# Patient Record
Sex: FEMALE | Race: BLACK OR AFRICAN AMERICAN
[De-identification: names, ages, dates, MRNs, and addresses within clinical notes are randomized per-mention and may not be internally consistent; named-entity substitution may affect disease eponyms.]

---

## 2017-03-03 ENCOUNTER — HOSPITAL ENCOUNTER (EMERGENCY)
Dept: HOSPITAL 62 - ER | Age: 27
Discharge: HOME | End: 2017-03-03
Payer: SELF-PAY

## 2017-03-03 VITALS — SYSTOLIC BLOOD PRESSURE: 106 MMHG | DIASTOLIC BLOOD PRESSURE: 65 MMHG

## 2017-03-03 DIAGNOSIS — R50.9: ICD-10-CM

## 2017-03-03 DIAGNOSIS — M79.1: ICD-10-CM

## 2017-03-03 DIAGNOSIS — R53.1: ICD-10-CM

## 2017-03-03 DIAGNOSIS — B97.89: ICD-10-CM

## 2017-03-03 DIAGNOSIS — J06.9: Primary | ICD-10-CM

## 2017-03-03 PROCEDURE — 99283 EMERGENCY DEPT VISIT LOW MDM: CPT

## 2017-03-03 PROCEDURE — 87804 INFLUENZA ASSAY W/OPTIC: CPT

## 2017-03-03 NOTE — ER DOCUMENT REPORT
ED General





- General


Chief Complaint: Cough


Stated Complaint: WEAKNESS


Mode of Arrival: Ambulatory


Information source: Patient


Notes: 


26-year-old female presents with complaints of body aches and fevers.  Patient 

notes symptoms have been ongoing now for 2 days.  Patient denies any nausea 

vomiting notes multiple coworkers have flu


TRAVEL OUTSIDE OF THE [place] IN LAST 30 DAYS: No





- HPI


Onset: Other - 2-3 day duration


Onset/Duration: Persistent


Quality of pain: Achy


Severity: Mild


Pain Level: 1


Associated symptoms: Body/muscle aches, Nonproductive cough, Fever


Exacerbated by: Denies


Relieved by: Denies


Similar symptoms previously: No


Recently seen / treated by doctor: No





- Related Data


Allergies/Adverse Reactions: 


 





pineapple [Pineapple] Allergy (Verified 03/03/17 07:36)


 











Past Medical History





- Social History


Smoking Status: Never Smoker


Cigarette use (# per day): No


Chew tobacco use (# tins/day): No


Smoking Education Provided: No


Frequency of alcohol use: None


Drug Abuse: None


Family History: Reviewed & Not Pertinent


Patient has suicidal ideation: No


Patient has homicidal ideation: No


Pulmonary Medical History: Reports: Hx Bronchitis


Neurological Medical History: Reports: Hx Migraine


Renal/ Medical History: Denies: Hx Peritoneal Dialysis


Psychiatric Medical History: Reports: Hx Depression





- Immunizations


Hx Diphtheria, Pertussis, Tetanus Vaccination: Yes





Review of Systems





- Review of Systems


Notes: 


REVIEW OF SYSTEMS:


CONSTITUTIONAL : Admits to fevers


EENT:   Denies eye, ear, throat, or mouth pain or symptoms.  Denies nasal or 

sinus congestion or discharge.  Denies throat, tongue, or mouth swelling or 

difficulty swallowing.


CARDIOVASCULAR:  Denies chest pain.  Denies palpitations or racing or irregular 

heart beat.  Denies ankle edema.


RESPIRATORY:  Denies cough, cold, or chest congestion.  Denies shortness of 

breath, difficulty breathing, or wheezing.


GASTROINTESTINAL:  Denies abdominal pain or distention.  Denies nausea, vomiting

, or diarrhea.  Denies blood in vomitus, stools, or per rectum.  Denies black, 

tarry stools.  Denies constipation. 


GENITOURINARY:  Denies difficulty urinating, painful urination, burning, 

frequency, blood in urine, or discharge.


FEMALE  GENITOURINARY:  Denies vaginal bleeding, heavy or abnormal periods, 

irregular periods.  Denies vaginal discharge or odor. 


MUSCULOSKELETAL: Admits to body aches


SKIN:   Denies rash, lesions or sores.


HEMATOLOGIC :   Denies easy bruising or bleeding.


LYMPHATIC:  Denies swollen, enlarged glands.


NEUROLOGICAL:  Denies confusion or altered mental status.  Denies passing out 

or loss of consciousness.  Denies dizziness or lightheadedness.  Denies 

headache.  Denies weakness or paralysis or loss of use of either side.  Denies 

problems with gait or speech.  Denies sensory loss, numbness, or tingling.  

Denies seizures.


PSYCHIATRIC:  Denies anxiety or stress.  Denies depression, suicidal ideation, 

or homicidal ideation.





ALL OTHER SYSTEMS REVIEWED AND NEGATIVE.








Dictation was performed using Dragon voice recognition software 











PHYSICAL EXAMINATION:





GENERAL: Well-appearing, well-nourished and in no acute distress.





HEAD: Atraumatic, normocephalic.





EYES: Pupils equal round and reactive to light, extraocular movements intact, 

conjunctiva are normal.





ENT: Nares patent, oropharynx clear without exudates.  Moist mucous membranes.





NECK: Normal range of motion, supple without lymphadenopathy





LUNGS: Breath sounds clear to auscultation bilaterally and equal.  No wheezes 

rales or rhonchi.





HEART: Regular rate and rhythm without murmurs





ABDOMEN: Soft, nontender, nondistended abdomen.  No guarding, no rebound.  No 

masses appreciated.





Female : deferred





Musculoskeletal: Normal range of motion, no pitting or edema.  No cyanosis.





NEUROLOGICAL: Cranial nerves grossly intact.  Normal speech, normal gait.  

Normal sensory, motor exams





PSYCH: Normal mood, normal affect.





SKIN: Warm, Dry, normal turgor, no rashes or lesions noted.





Physical Exam





- Vital signs


Vitals: 


 











Temp Pulse Resp BP Pulse Ox


 


 98.0 F   75   18   105/66   98 


 


 03/03/17 07:33  03/03/17 07:33  03/03/17 07:33  03/03/17 07:33  03/03/17 07:33














Course





- Re-evaluation


Re-evalutation: 


03/03/17 09:16


Physical examination notes no significant abnormality, patient's otherwise 

stable at this time.  Lab work pending





03/03/17 09:48


Influenza was negative, patient has viral syndromes otherwise stable for 

discharge











After performing a Medical Screening Examination, I estimate there is LOW risk 

for ACUTE CORONARY SYNDROME, RESPIRATORY FAILURE, SEPSIS OR MENINGITIS, thus I 

consider the discharge disposition reasonable. The patient and I have discussed 

the diagnosis and risks, and we agree with discharging home with close follow-

up. We also discussed returning to the Emergency Department immediately if new 

or worsening symptoms occur. We have discussed the symptoms which are most 

concerning (e.g., changing or worsening pain, trouble swallowing or breathing, 

neck stiffness, fever) that necessitate immediate return.





- Vital Signs


Vital signs: 


 











Temp Pulse Resp BP Pulse Ox


 


 98.0 F   75   18   105/66   98 


 


 03/03/17 07:33  03/03/17 07:33  03/03/17 07:33  03/03/17 07:33  03/03/17 07:33














Discharge





- Discharge


Clinical Impression: 


 Viral upper respiratory infection, Body aches





Condition: Stable


Disposition: HOME, SELF-CARE


Instructions:  Upper Respiratory Illness ([facility])


Additional Instructions: 


Follow up with your physician tomorrow for further care or return to the ED 

IMMEDIATELY if symptoms worsen or new concerns occur


Forms:  Return to Work

## 2017-03-28 ENCOUNTER — HOSPITAL ENCOUNTER (EMERGENCY)
Dept: HOSPITAL 62 - ER | Age: 27
LOS: 1 days | Discharge: HOME | End: 2017-03-29
Payer: SELF-PAY

## 2017-03-28 DIAGNOSIS — S93.402A: Primary | ICD-10-CM

## 2017-03-28 DIAGNOSIS — M25.572: ICD-10-CM
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
## 2017-03-28 PROCEDURE — L1902 AFO ANKLE GAUNTLET PRE OTS: HCPCS

## 2017-03-28 PROCEDURE — 29515 APPLICATION SHORT LEG SPLINT: CPT

## 2017-03-28 PROCEDURE — 2W3FX1Z IMMOBILIZATION OF LEFT HAND USING SPLINT: ICD-10-PCS | Performed by: EMERGENCY MEDICINE

## 2017-03-28 PROCEDURE — 99283 EMERGENCY DEPT VISIT LOW MDM: CPT

## 2017-03-28 PROCEDURE — 73610 X-RAY EXAM OF ANKLE: CPT

## 2017-03-28 NOTE — ER DOCUMENT REPORT
ED Medical Screen (RME)





- General


Chief Complaint: Ankle Injury


Stated Complaint: LEFT ANKLE PAIN


Time seen by provider: 21:27


Mode of Arrival: Ambulatory


Information source: Patient


Notes: 


26-year-old female presents to ED for left ankle pain since yesterday.  She 

states it keeps popping worse when she walks.  States she almost fell off the 

porch on Saturday.  Last menstrual period 03/06/2017














I have greeted and performed a rapid initial assessment of this patient.  A 

comprehensive ED assessment and evaluation of the patient, analysis of test 

results and completion of medical decision making process will be conducted by 

an additional ED providers.


TRAVEL OUTSIDE OF THE [place] IN LAST 30 DAYS: No





- Related Data


Allergies/Adverse Reactions: 


 





pineapple [Pineapple] Allergy (Verified 03/03/17 07:36)


 











Past Medical History





- Social History


Family history: None


Pulmonary Medical History: Reports: Hx Bronchitis


Neurological Medical History: Reports: Hx Migraine


Renal/ Medical History: Denies: Hx Peritoneal Dialysis


Psychiatric Medical History: Reports: Hx Depression





- Immunizations


Hx Diphtheria, Pertussis, Tetanus Vaccination: Yes

## 2017-03-29 VITALS — DIASTOLIC BLOOD PRESSURE: 73 MMHG | SYSTOLIC BLOOD PRESSURE: 119 MMHG

## 2017-03-29 NOTE — ER DOCUMENT REPORT
ED Extremity Problem, Lower





- General


Chief Complaint: Ankle Injury


Stated Complaint: LEFT ANKLE PAIN


Mode of Arrival: Ambulatory


Information source: Patient


Notes: 


25 y/o F presents to ED c/o L ankle pain. Pt reports twisted her ankle 3 days 

ago during mechanical trip and fall down the last 2 steps of stairs. Reports 

did not have obvious injury intitally after fall but over the last 2 days has 

had persistent pain worse with movement and ambulation and states feels popping 

sensation when ambulating. Denies numbness, tingling, or color changes. Denies 

striking head or loc during fall. 


TRAVEL OUTSIDE OF THE [place] IN LAST 30 DAYS: No





- HPI


Patient complains to provider of: Pain


Location: Ankle


Onset/Duration: Gradual


Quality of pain: Achy


Severity: Moderate


Pain Level: 3


Context: Fell, Twisted


Recent injury: Possibly


Associated symptoms: Painful ambulation


Exacerbated by: Movement, Walking


Relieved by: Elevation, Rest





- Related Data


Allergies/Adverse Reactions: 


 





pineapple [Pineapple] Allergy (Verified 03/03/17 07:36)


 











Past Medical History





- General


Information source: Patient





- Social History


Smoking Status: Never Smoker


Frequency of alcohol use: None


Drug Abuse: None


Lives with: Family


Family History: Reviewed & Not Pertinent


Pulmonary Medical History: Reports: Hx Bronchitis


Neurological Medical History: Reports: Hx Migraine


Renal/ Medical History: Denies: Hx Peritoneal Dialysis


Psychiatric Medical History: Reports: Hx Depression


Surgical Hx: Negative





- Immunizations


Hx Diphtheria, Pertussis, Tetanus Vaccination: Yes





Review of Systems





- Review of Systems


Constitutional: No symptoms reported


EENT: No symptoms reported


Cardiovascular: No symptoms reported


Respiratory: No symptoms reported


Gastrointestinal: No symptoms reported


Genitourinary: No symptoms reported


Female Genitourinary: No symptoms reported


Musculoskeletal: See HPI


Skin: No symptoms reported


Hematologic/Lymphatic: No symptoms reported


Neurological/Psychological: No symptoms reported


-: Yes All other systems reviewed and negative





Physical Exam





- Vital signs


Vitals: 


 











Temp Pulse Resp BP Pulse Ox


 


 98.2 F   78   15   119/73   99 


 


 03/29/17 02:28  03/29/17 02:28  03/29/17 02:28  03/29/17 02:28  03/29/17 02:28














- General


General appearance: Appears well, Alert


In distress: None





- Respiratory


Respiratory status: No respiratory distress


Chest status: Nontender


Breath sounds: Normal


Chest palpation: Normal





- Cardiovascular


Rhythm: Regular


Heart sounds: Normal auscultation


Murmur: No


Pulses: Normal: Radial, Posterior tibial, Dorsalis pedis


Normal capillary refill: Yes





- Back


Back: Normal, Nontender





- Extremities


General upper extremity: Normal inspection, Nontender, Normal color, Normal ROM

, Normal strength, Normal temperature.  No: Tender, Edema


General lower extremity: Normal inspection, Nontender, Normal color, Normal ROM

, Normal strength, Normal temperature, Normal weight bearing.  No: Tender, Edema

, Catracho's sign


Hip: Normal, Nontender


Thigh: Normal, Nontender


Knee: Normal, Nontender


Calf: Normal, Nontender


Ankle: Tender - Mild tenderness with palpation to anterior and medial aspect of 

left ankle.  Full but painful active, passive, and against resistance range of 

motion.  Motor and neurovascular function intact with immediate capillary refill

, palpable pedal pulses, and intact sensation..  No: Normal, Nontender, Abrasion

, Deformity, Ecchymosis, Edema, Instability, Laceration, Limited ROM, Positive 

Lowery's test, Unable to bear weight, Other


Foot: Normal, Nontender





- Neurological


Neuro grossly intact: Yes


Cognition: Normal


Orientation: AAOx4


Jovon Coma Scale Eye Opening: Spontaneous


Jovon Coma Scale Verbal: Oriented


[place] Coma Scale Motor: Obeys Commands


[place] Coma Scale Total: 15


Speech: Normal


Motor strength normal: LUE, RUE, LLE, RLE


Sensory: Normal





- Skin


Skin Temperature: Warm


Skin Moisture: Dry


Skin Color: Normal





Course





- Re-evaluation


Re-evalutation: 





03/29/17 02:21


Patient hemodynamically stable, in no distress.  X-rays negative for osseous 

injury.  Ankle stirrup splint placed.  Patient appears stable for discharge and 

agrees with home care, follow-up with PCP, and ED return precautions.





- Vital Signs


Vital signs: 


 











Temp Pulse Resp BP Pulse Ox


 


 98.2 F   78   15   119/73   99 


 


 03/29/17 02:28  03/29/17 02:28  03/29/17 02:28  03/29/17 02:28  03/29/17 02:28














- Diagnostic Test


Radiology reviewed: Image reviewed, Reports reviewed





Procedures





- Immobilization


  ** Left Ankle


Time completed: 02:22


Pre-Proc Neuro Vasc Exam: Normal


Immobilizer type: Ankle stirrup


Performed by: RN, PCT


Post-Proc Neuro Vasc Exam: Normal


Alignment checked and good: Yes





Discharge





- Discharge


Clinical Impression: 


Left ankle sprain


Qualifiers:


 Encounter type: initial encounter Involved ligament of ankle: unspecified 

ligament Qualified Code(s): S93.402A - Sprain of unspecified ligament of left 

ankle, initial encounter





Condition: Stable


Disposition: HOME, SELF-CARE


Additional Instructions: 


SPRAINED ANKLE:





     Your sprained ankle results from stretching or tearing of the ligaments 

which support the ankle.  This usually results from twisting the foot inward 

and under.  The ligaments will require time and protection in order to heal 

properly.  Many ankle sprains are quite disabling, and should be taken 

seriously.


     The usual treatment for an ankle sprain is cold packs; protection with tape

, splints, or wraps; elevation; and staying off the ankle for at least a day.  

As the ankle improves, you can walk IF it's not painful to bear weight.  Sports 

are best postponed until healing is complete.  More serious sprains usually 

require strengthening exercises after early healing.


     Your physician has assessed the seriousness of the ligament injury to your 

ankle.  However, the treatment may change, depending on how your ankle 

progresses.  If further exams were recommended, it is important that you follow 

through.  Call the doctor if your foot becomes numb, painful, or severely 

swollen.








ANKLE STIRRUP SPLINT:


     You are to use an ankle brace called a stirrup splint.  This type of brace 

allows you to place greater stresses on the ankle without risk of re-injury, 

and is often used for more severe ankle injuries such as avulsion fractures and 

ligament ruptures.


     The splint can be worn over a sock or tape.  For proper support, wear the 

splint with a shoe over it.


     It's important that the splint fit properly.  Adjust the heel tension, if 

needed.  If your splint has air bladders, peel back the bottom of each air 

bladder, then move the Velcro attachment of the heel strap up or down.  Air 

bladder pressure can be adjusted by pulling up the valve at the top, threading 

the air tube down into the main bladder, then blowing air into the bladder or 

squeezing it out.  The two sides of the stirrup can be moved forward or back on 

your ankle by changing the attachment of the main straps.


     If you are unable to use the ankle comfortably in the splint, return for re

-evaluation.








ICE & ELEVATION:


     Apply ice packs frequently against the painful area.  Many different 

schedules are recommended, such as "20 minutes on, 20 minutes off" or "one hour 

ice, two hours rest."  If you need to work, you may need to go longer between 

ice treatments.  You should plan to have the area ice packed AT LEAST one-

fourth of the time.


     The ice should be applied over the wrap, tape, or splint, or over a layer 

of cloth -- not directly against the skin.  Some ice bags have a built-in cloth 

and can be put directly on the skin.


     Your injured part should be elevated as much as possible over the next 48 

hours.  Try to keep the injury above the level of the heart. Avoid use of the 

injured area.  Elevation and rest will decrease the swelling.





Anti-Inflammatory Medication





     You have received a prescription for an antiinflammatory agent. This is an 

excellent, safe drug for pain control.  In addition, it has potent 

antiinflammatory effects which are beneficial, especially in the treatment of 

injuries, arthritis, or tendonitis.


     It's best to take this medicine with food.  Persons with ulcer disease or 

allergy to aspirin should notify their physician of this before taking this 

drug.


     Take the medication exactly as prescribed.  Don't take additional doses 

unless instructed to do so by your doctor.  If you develop wheezing, shortness 

of breath, hives, faintness, stomach pain, vomiting, or dark black stools, 

return for re-evaluation at once.





FOLLOW-UP CARE:


Follow-up with your primary care provider this week.


Return to the emergency department for any worsening symptoms or concerns.


Prescriptions: 


Naproxen 500 mg PO BIDP PRN #10 tablet


 PRN Reason: 


Forms:  Return to Work

## 2017-04-02 ENCOUNTER — HOSPITAL ENCOUNTER (EMERGENCY)
Dept: HOSPITAL 62 - ER | Age: 27
Discharge: HOME | End: 2017-04-02
Payer: SELF-PAY

## 2017-04-02 VITALS — SYSTOLIC BLOOD PRESSURE: 122 MMHG | DIASTOLIC BLOOD PRESSURE: 67 MMHG

## 2017-04-02 DIAGNOSIS — S93.402A: Primary | ICD-10-CM

## 2017-04-02 DIAGNOSIS — M25.572: ICD-10-CM
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
## 2017-04-02 DIAGNOSIS — R11.0: ICD-10-CM

## 2017-04-02 PROCEDURE — S0119 ONDANSETRON 4 MG: HCPCS

## 2017-04-02 PROCEDURE — 73610 X-RAY EXAM OF ANKLE: CPT

## 2017-04-02 PROCEDURE — 99283 EMERGENCY DEPT VISIT LOW MDM: CPT

## 2017-04-02 PROCEDURE — 81025 URINE PREGNANCY TEST: CPT

## 2017-04-02 NOTE — ER DOCUMENT REPORT
HPI





- HPI


Patient complains to provider of: ankle pain


Pain Level: 3


Context: 


Patient is a 26-year-old female presents emergency Department complaining of 

ankle pain.  She was evaluated here on March 28 and diagnosed with a left ankle 

sprain sent home with ankle brace as well as encourage for Rice management.  

Patient states that she's been on her feet constantly for work and Motrin is 

not managing her pain well enough when she is at work.  Otherwise she denies 

any worsening swelling or bruising.  She is able to bear weight and gait with a 

limp.





ROS also positive for nausea.  Patient states that her last menstrual period 

was at the end of February. 





- REPRODUCTIVE


LMP: 3/6/17


Reproductive: DENIES: Pregnant:





- DERM


Skin Color: Normal, Pink





Past Medical History





- Social History


Smoking Status: Unknown if Ever Smoked


Family History: Reviewed & Not Pertinent


Patient has suicidal ideation: No


Patient has homicidal ideation: No


Pulmonary Medical History: Reports: Hx Bronchitis


Neurological Medical History: Reports: Hx Migraine


Renal/ Medical History: Denies: Hx Peritoneal Dialysis


Psychiatric Medical History: Reports: Hx Depression





- Immunizations


Hx Diphtheria, Pertussis, Tetanus Vaccination: Yes





Vertical Provider Document





- CONSTITUTIONAL


Agree With Documented VS: Yes


Exam Limitations: No Limitations


General Appearance: WD/WN, No Apparent Distress





- INFECTION CONTROL


TRAVEL OUTSIDE OF THE [place] IN LAST 30 DAYS: No





- RESPIRATORY


O2 Sat by Pulse Oximetry: 100





- GI/ABDOMEN


Gastrointestinal: Abdomen Soft, Abdomen Non-Tender, No Organomegaly, Normal 

Bowel Sounds





- MUSCULOSKELETAL/EXTREMETIES


Musculoskeletal/Extremeties: MAEW, FROM, Tender - Concern for high ankle sprain

, No Edema.  negative: Eccymosis





- NEURO


Level of Consciousness: Awake, Alert, Appropriate


Motor/Sensory: No Motor Deficit, No Sensory Deficit





- DERM


Integumentary: Warm, Dry, No Rash





Course





- Re-evaluation


Re-evalutation: 


04/02/17 22:10 patient is a 26-year-old female who presents emergency 

department for reevaluation of her right ankle.  We have imaging of the ankle 

does not reveal any fracture or dislocation.  Educated patient that due to the 

nature of her work that her ankle will take longer to heal.  Patient expressed 

understanding.








- Vital Signs


Vital signs: 


 











Temp Pulse Resp BP Pulse Ox


 


 98.1 F   82   18   122/67   100 


 


 04/02/17 20:00  04/02/17 20:00  04/02/17 20:00  04/02/17 20:00  04/02/17 20:00














- Diagnostic Test


Radiology reviewed: Image reviewed, Reports reviewed





Discharge





- Discharge


Clinical Impression: 


 Nausea





Left ankle sprain


Qualifiers:


 Encounter type: initial encounter Involved ligament of ankle: unspecified 

ligament Qualified Code(s): S93.402A - Sprain of unspecified ligament of left 

ankle, initial encounter





Condition: Good


Disposition: HOME, SELF-CARE


Additional Instructions: 


Continued to use ice, compression and elevation.  Please continue taking Motrin 

as he Then.  Only take the tramadol as needed for breakthrough pain.


Prescriptions: 


Ondansetron HCl [Zofran] 4 mg PO Q8HP PRN #10 tablet


 PRN Reason: 


Tramadol HCl 50 mg PO Q8HP PRN #10 tablet


 PRN Reason: 


Referrals: 


YASMEEN RUIZ MD [[facility]] - Follow up as needed


MAHIN GONZALEZ MD [NO LOCAL MD] - Follow up as needed

## 2017-05-14 ENCOUNTER — HOSPITAL ENCOUNTER (EMERGENCY)
Dept: HOSPITAL 62 - ER | Age: 27
Discharge: HOME | End: 2017-05-14
Payer: SELF-PAY

## 2017-05-14 VITALS — SYSTOLIC BLOOD PRESSURE: 112 MMHG | DIASTOLIC BLOOD PRESSURE: 68 MMHG

## 2017-05-14 DIAGNOSIS — Z91.018: ICD-10-CM

## 2017-05-14 DIAGNOSIS — H10.9: Primary | ICD-10-CM

## 2017-05-14 DIAGNOSIS — H11.003: ICD-10-CM

## 2017-05-14 PROCEDURE — 99282 EMERGENCY DEPT VISIT SF MDM: CPT

## 2017-05-14 NOTE — ER DOCUMENT REPORT
ED Eye Complaint





- General


Chief Complaint: Redness of Eye


Stated Complaint: EYE IRRITATION


Time Seen by Provider: 05/14/17 22:04


Notes: 


Patient is a 26-year-old female that comes emergency department for chief 

complaint of right eye itching and irritation, she states this began yesterday, 

she states her eye is more red now and she has some discomfort in the eye as 

well.  She reports only clear drainage.  She denies injury to the eye but 

admits she has been rubbing her eye.  He does not wear any visual correction.  

Her significant other has developed the same symptoms.  She denies fever, she 

denies any other symptoms.





TRAVEL OUTSIDE OF THE [place] IN LAST 30 DAYS: No





- Related Data


Allergies/Adverse Reactions: 


 





pineapple [Pineapple] Allergy (Verified 04/02/17 20:00)


 











Past Medical History





- General


Information source: Patient





- Social History


Smoking Status: Never Smoker


Cigarette use (# per day): No


Chew tobacco use (# tins/day): No


Frequency of alcohol use: None


Drug Abuse: None


Lives with: Family


Family History: Reviewed & Not Pertinent


Patient has suicidal ideation: No


Patient has homicidal ideation: No


Pulmonary Medical History: Reports: Hx Bronchitis


Neurological Medical History: Reports: Hx Migraine


Renal/ Medical History: Denies: Hx Peritoneal Dialysis


Psychiatric Medical History: Reports: Hx Depression


Surgical Hx: Negative





- Immunizations


Hx Diphtheria, Pertussis, Tetanus Vaccination: Yes





Review of Systems





- Review of Systems


Constitutional: No symptoms reported


EENT: See HPI


Cardiovascular: No symptoms reported


Respiratory: No symptoms reported


Gastrointestinal: No symptoms reported


Genitourinary: No symptoms reported


Female Genitourinary: No symptoms reported


Musculoskeletal: No symptoms reported


Skin: No symptoms reported


Hematologic/Lymphatic: No symptoms reported


Neurological/Psychological: No symptoms reported





Physical Exam





- Vital signs


Vitals: 


 











Temp Pulse Resp BP Pulse Ox


 


 97.7 F   85   16   117/65   100 


 


 05/14/17 20:50  05/14/17 20:50  05/14/17 20:50  05/14/17 20:50  05/14/17 20:50











Interpretation: Normal





- General


General appearance: Appears well


In distress: None





- HEENT


Head: Normocephalic, Atraumatic


Eyes: Normal


Conjunctiva: Other - Mild injection of the right conjunctiva, no matting of the 

eyelashes, no swelling of the eyelids, no purulent injection noted, normal 

pupils, normal EOMs, small pterygium noted bilaterally which does not cover the 

cornea, exam otherwise unremarkable


Cornea: Normal, Other - No uptake.  No: Corneal abrasion, Corneal ulcer, 

Dendrite, Flourescein stain uptake


Eyelashes: Normal


Pupils: PERRL


Corrective lenses worn: No


Anterior chamber: Normal.  No: Hyphema


Ears: Normal


Nasal: Normal


Mouth/Lips: Normal


Mucous membranes: Normal


Pharynx: Normal


Neck: Normal





- Respiratory


Respiratory status: No respiratory distress


Chest status: Nontender


Breath sounds: Normal.  No: Decreased air movement, Nonproductive cough, 

Wheezing


Chest palpation: Normal





- Cardiovascular


Rhythm: Regular.  No: Tachycardia


Heart sounds: Normal auscultation, S1 appreciated, S2 appreciated


Murmur: No





- Abdominal


Inspection: Normal


Distension: No distension


Bowel sounds: Normal


Tenderness: Nontender.  No: Tender


Organomegaly: No organomegaly





- Back


Back: Normal, Nontender.  No: Tender





- Extremities


General upper extremity: Normal inspection, Nontender, Normal ROM, Normal 

strength


General lower extremity: Normal inspection, Nontender, Normal ROM, Normal 

strength





- Neurological


Neuro grossly intact: Yes


Cognition: Normal


Orientation: AAOx4


[place] Coma Scale Eye Opening: Spontaneous


[place] Coma Scale Verbal: Oriented


[place] Coma Scale Motor: Obeys Commands


[place] Coma Scale Total: 15


Speech: Normal


Motor strength normal: LUE, RUE, LLE, RLE


Sensory: Normal





- Psychological


Associated symptoms: Normal affect, Normal mood





- Skin


Skin Temperature: Warm


Skin Moisture: Dry


Skin Color: Normal





Course





- Re-evaluation


Re-evalutation: 


Examination is consistent with mild conjunctivitis, patient does not have any 

discolored discharge, the Woods light examination shows no dye uptake or any 

concerning findings.  Incidental finding of pterygium which is also mild.  

Discussed likely viral infection, patient was given topical antibiotics to use 

if symptoms do not clear up in the next couple of days, discussed follow-up, 

discussed return precautions, patient states understanding and agreement.





- Vital Signs


Vital signs: 


 











Temp Pulse Resp BP Pulse Ox


 


 97.6 F   80   18   112/68   99 


 


 05/14/17 23:08  05/14/17 23:08  05/14/17 23:08  05/14/17 23:08  05/14/17 23:08














Discharge





- Discharge


Clinical Impression: 


Conjunctivitis


Qualifiers:


 Conjunctivitis type: unspecified Laterality: right Qualified Code(s): H10.9 - 

Unspecified conjunctivitis





Condition: Stable


Disposition: HOME, SELF-CARE


Additional Instructions: 


Examination is consistent with viral conjunctivitis.  This goes away with time 

but is contagious.  


Wash hands frequently, try to avoid rubbing the eye with your hands. 


Use the drop as prescribed in addition to this.


Follow-up with primary care.


Return to emergency department for any concerning worsening symptoms including 

swelling of the eye, loss of vision, severe pain, etc.


Forms:  Return to Work

## 2017-05-16 ENCOUNTER — HOSPITAL ENCOUNTER (EMERGENCY)
Dept: HOSPITAL 62 - ER | Age: 27
Discharge: HOME | End: 2017-05-16
Payer: SELF-PAY

## 2017-05-16 VITALS — DIASTOLIC BLOOD PRESSURE: 68 MMHG | SYSTOLIC BLOOD PRESSURE: 115 MMHG

## 2017-05-16 DIAGNOSIS — H93.19: ICD-10-CM

## 2017-05-16 DIAGNOSIS — H10.9: Primary | ICD-10-CM

## 2017-05-16 PROCEDURE — 99282 EMERGENCY DEPT VISIT SF MDM: CPT

## 2017-05-16 NOTE — ER DOCUMENT REPORT
HPI





- HPI


Patient complains to provider of: conjunctivitis


Pain Level: 3


Context: 


patient is a 26 year old female who presents complaining of intermittent 

tinnitus was diagnosed on Sunday.  Patient has been taking the drops less than 

the prescribed amount.  She states she came in today for a new work note since 

she cannot go back to work in her current condition.  Otherwise denies any 

changes in vision, light sensitivity, blurriness, he was.





- REPRODUCTIVE


LMP: 5/6/17


Reproductive: DENIES: Pregnant:





- DERM


Skin Color: Normal





Past Medical History





- Social History


Smoking Status: Unknown if Ever Smoked


Family History: Reviewed & Not Pertinent


Patient has suicidal ideation: No


Patient has homicidal ideation: No


Pulmonary Medical History: Reports: Hx Bronchitis


Neurological Medical History: Reports: Hx Migraine


Renal/ Medical History: Denies: Hx Peritoneal Dialysis


Psychiatric Medical History: Reports: Hx Depression





- Immunizations


Hx Diphtheria, Pertussis, Tetanus Vaccination: Yes





Vertical Provider Document





- CONSTITUTIONAL


Agree With Documented VS: Yes


Exam Limitations: No Limitations


General Appearance: WD/WN, No Apparent Distress





- INFECTION CONTROL


TRAVEL OUTSIDE OF THE [place] IN LAST 30 DAYS: No





- HEENT


HEENT: Atraumatic, Conjuctival Injection, Normal ENT Exam, Normocephalic, PERRLA





- NECK


Neck: Normal Inspection.  negative: Lymphadenopathy-Left, Lymphadenopathy-Right





- RESPIRATORY


Respiratory: Breath Sounds Normal, No Respiratory Distress, Chest Non-Tender


O2 Sat by Pulse Oximetry: 98





- CARDIOVASCULAR


Cardiovascular: Regular Rate, Regular Rhythm, No Murmur





Course





- Re-evaluation


Re-evalutation: 





05/16/17 17:16


Told patient to continue her polymyxin drops 4 times a day.  Follow-up with 

ophthalmology tomorrow.





- Vital Signs


Vital signs: 


 











Temp Pulse Resp BP Pulse Ox


 


 97.9 F   79   16   115/68   98 


 


 05/16/17 14:35  05/16/17 14:35  05/16/17 14:35  05/16/17 14:35  05/16/17 14:35














Discharge





- Discharge


Clinical Impression: 


 Conjunctivitis





Condition: Good


Disposition: HOME, SELF-CARE


Instructions:  Conjunctivitis ([facility]), Eyedrop Use ([facility])


Additional Instructions: 


Examination is consistent with viral conjunctivitis.  This goes away with time 

but is contagious.  


Wash hands frequently, try to avoid rubbing the eye with your hands. 


Use the drop as prescribed in addition to this.


Follow-up with primary care.


Return to emergency department for any concerning worsening symptoms including 

swelling of the eye, loss of vision, severe pain, etc.


Forms:  Return to Work


Referrals: 


ABBI ROBERSON MD [ACTIVE STAFF] - Follow up tomorrow

## 2017-05-22 ENCOUNTER — HOSPITAL ENCOUNTER (EMERGENCY)
Dept: HOSPITAL 62 - ER | Age: 27
Discharge: HOME | End: 2017-05-22
Payer: SELF-PAY

## 2017-05-22 VITALS — SYSTOLIC BLOOD PRESSURE: 115 MMHG | DIASTOLIC BLOOD PRESSURE: 61 MMHG

## 2017-05-22 DIAGNOSIS — H11.001: ICD-10-CM

## 2017-05-22 DIAGNOSIS — H10.89: Primary | ICD-10-CM

## 2017-05-22 DIAGNOSIS — H57.8: ICD-10-CM

## 2017-05-22 PROCEDURE — 99283 EMERGENCY DEPT VISIT LOW MDM: CPT

## 2017-05-22 NOTE — ER DOCUMENT REPORT
ED General





- General


Chief Complaint: Drainage from Eye


Stated Complaint: EYE PAIN


Time Seen by Provider: 05/22/17 16:05


Mode of Arrival: Ambulatory


Information source: Patient


Notes: 


Patient is a 26 year old female who presents with 1 week history of eye redness

, green drainage and pain. She states she has been seen here twice for the same 

but no relief in symptoms. She endorses med compliance with the polytrim drops. 

She does not wear contacts or eye glasses. She endorses pain with movement of 

her eye but denies fever, chills, eye lid swelling, changes in vision, blurred 

vision, headache, dizziness, or FB sensation. She has not seen an eye doctor. 


TRAVEL OUTSIDE OF THE [place] IN LAST 30 DAYS: No





- Related Data


Allergies/Adverse Reactions: 


 





No Known Drug Allergies Allergy (Verified 05/22/17 15:17)


 


pineapple [Pineapple] Allergy (Verified 05/22/17 15:17)


 











Past Medical History





- General


Information source: Patient





- Social History


Smoking Status: Never Smoker


Family History: Reviewed & Not Pertinent


Patient has suicidal ideation: No


Patient has homicidal ideation: No


Pulmonary Medical History: Reports: Hx Bronchitis


Neurological Medical History: Reports: Hx Migraine


Renal/ Medical History: Denies: Hx Peritoneal Dialysis


Psychiatric Medical History: Reports: Hx Depression





- Immunizations


Hx Diphtheria, Pertussis, Tetanus Vaccination: Yes





Review of Systems





- Review of Systems


Constitutional: See HPI


EENT: See HPI


Cardiovascular: No symptoms reported


Respiratory: No symptoms reported


Gastrointestinal: No symptoms reported


Genitourinary: No symptoms reported


Female Genitourinary: No symptoms reported


Musculoskeletal: No symptoms reported


Skin: No symptoms reported


Hematologic/Lymphatic: No symptoms reported


Neurological/Psychological: No symptoms reported





Physical Exam





- Vital signs


Vitals: 


 











Temp Pulse Resp BP Pulse Ox


 


 98.0 F   77   16   104/66   99 


 


 05/22/17 15:16  05/22/17 15:16  05/22/17 15:16  05/22/17 15:16  05/22/17 15:16














- Notes


Notes: 


PHYSICAL EXAM:


CONSTITUTIONAL: Alert and oriented, well-appearing and in no acute distress. 


HENT: Normocephalic, atraumatic. Trachea midline. Uvula midline. Moist mucous 

membranes.


EYES: Pupils equal round and reactive to light, EOM intact. Sclera anicteric, 

right conjunctiva erythematous. No chemosis. no entrapment. Pterygium of right 

eye noted. No FB noted. Green drainage noted to medial canthus. 


NECK: supple without lymphadenopathy. No midline tenderness or paraspinous 

muscle spasms. No step-offs or deformities. ROM intact. 


HEART: Regular rate and rhythm without murmurs. 


LUNGS: CTAB and equal. No wheezes, rales or rhonchi. 


EXTREMITIES: Normal range of motion, no pitting edema. No cyanosis. Cap Refill <

3 seconds.


NEURO: Cranial nerves grossly intact. Normal sensory/motor exams.


PSYCH: Normal mood, normal affect. 


SKIN: Warm and dry. Normal turgor. No rashes or lesions noted.





Course





- Re-evaluation


Re-evalutation: 





05/22/17 16:49


Patient seen and examined. Exam consistent with bacterial conjunctivitis, 

incidental finding of pterygium. EOM intact, no orbital edema or erythema to 

suggest preseptal or orbital cellulitis. Discussed good hand hygiene to prevent 

further infection. Will given opth abx ointment and advised follow-up with 

opthamology. 





At this time, will discharge with return precautions and follow-up 

recommendations. Verbal discharge instructions given at the bedside and 

opportunity for questions given. Medication warnings reviewed. Patient is in 

agreement with this plan and has verbalized understanding of return precautions 

and the need for primary care follow-up in the next 24-72 hours. 








- Vital Signs


Vital signs: 


 











Temp Pulse Resp BP Pulse Ox


 


 98.0 F   77   16   104/66   99 


 


 05/22/17 15:16  05/22/17 15:16  05/22/17 15:16  05/22/17 15:16  05/22/17 15:16














Discharge





- Discharge


Clinical Impression: 


 Bacterial conjunctivitis of right eye





Pterygium


Qualifiers:


 Laterality: right Qualified Code(s): H11.001 - Unspecified pterygium of right 

eye





Condition: Stable


Disposition: HOME, SELF-CARE


Additional Instructions: 


Conjunctivitis





     You have an infection in your eye, commonly known as "pink eye." 

Conjunctivitis causes redness, mild discomfort, itching, and mattering on the 

eyelids.  It is very contagious, so you must be careful to wash your hands 

after touching your face so you don't pass the infection on to others.


     Conjunctivitis is caused by both viruses and bacteria.  It usually 

responds quickly to treatment with antibiotic drops.  These should be placed in 

the eye as prescribed (usually every three to four hours while you're awake).  

If you wear contact lenses, don't put them in your eyes until the infection is 

cleared and you are no longer using the drops (unless your doctor advises you 

otherwise).


     Should you develop increasing eye pain, severe swelling, decreased vision, 

or fail to improve as expected, please return for re-examination.


Prescriptions: 


Gentamicin Sulfate 3.5 gm OD Q8H #1 oint...g.


Forms:  Return to Work


Referrals: 


VICKY RAMIREZ DO [ACTIVE STAFF] - Follow up tomorrow

## 2017-11-12 ENCOUNTER — HOSPITAL ENCOUNTER (EMERGENCY)
Dept: HOSPITAL 62 - ER | Age: 27
Discharge: HOME | End: 2017-11-12
Payer: SELF-PAY

## 2017-11-12 VITALS — DIASTOLIC BLOOD PRESSURE: 60 MMHG | SYSTOLIC BLOOD PRESSURE: 106 MMHG

## 2017-11-12 DIAGNOSIS — M79.645: ICD-10-CM

## 2017-11-12 DIAGNOSIS — M77.9: Primary | ICD-10-CM

## 2017-11-12 PROCEDURE — 99283 EMERGENCY DEPT VISIT LOW MDM: CPT

## 2017-11-12 NOTE — RADIOLOGY REPORT (SQ)
EXAM DESCRIPTION:  HAND LEFT 3 VIEWS



COMPLETED DATE/TIME:  11/12/2017 9:56 am



REASON FOR STUDY:  Thumb pain



COMPARISON:  None.



EXAM PARAMETERS:  NUMBER OF VIEWS: Three views.

TECHNIQUE: AP, lateral and oblique  radiographic images acquired of the left hand.

LIMITATIONS: None.



FINDINGS:  MINERALIZATION: Normal.

BONES: No acute fracture or dislocation.  No worrisome bone lesions.

JOINTS: No effusions.

SOFT TISSUES: No soft tissue swelling.  No foreign body.

OTHER: No other significant finding.



IMPRESSION:  NEGATIVE STUDY OF THE LEFT HAND. NO RADIOGRAPHIC EVIDENCE OF ACUTE INJURY.



TECHNICAL DOCUMENTATION:  JOB ID:  [id number]

 2011 [employer]- All Rights Reserved

## 2017-11-12 NOTE — ER DOCUMENT REPORT
HPI





- HPI


Patient complains to provider of: left thumb pain


Onset: Other - several weeks


Onset/Duration: Gradual, Persistent, Worse


Pain Level: 4


Context: 





28 yo right handed female c/o left base of thumb pain that radiates into radial 

wrist with movement. Worse last night with hand wrapped around the steering 

wheel. No specificia injury.


Associated Symptoms: None


Exacerbated by: Movement


Relieved by: Denies


Similar symptoms previously: No


Recently seen / treated by doctor: No





- ROS


ROS below otherwise negative: Yes


Systems Reviewed and Negative: Yes All other systems reviewed and negative





- REPRODUCTIVE


Reproductive: DENIES: Pregnant:





- DERM


Skin Color: Normal





Past Medical History





- General


Information source: Patient





- Social History


Smoking Status: Never Smoker


Frequency of alcohol use: None


Drug Abuse: None


Lives with: Family


Family History: Reviewed & Not Pertinent


Patient has suicidal ideation: No


Patient has homicidal ideation: No


Pulmonary Medical History: Reports: Hx Bronchitis


Neurological Medical History: Reports: Hx Migraine


Renal/ Medical History: Denies: Hx Peritoneal Dialysis


Psychiatric Medical History: Reports: Hx Depression


Surgical Hx: Negative





- Immunizations


Hx Diphtheria, Pertussis, Tetanus Vaccination: Yes





Vertical Provider Document





- CONSTITUTIONAL


Agree With Documented VS: Yes


Exam Limitations: No Limitations


General Appearance: No Apparent Distress





- INFECTION CONTROL


TRAVEL OUTSIDE OF THE [place] IN LAST 30 DAYS: No





- NECK


Neck: Supple





- RESPIRATORY


O2 Sat by Pulse Oximetry: 100





- MUSCULOSKELETAL/EXTREMETIES


Musculoskeletal/Extremeties: MAEW, FROM, Tender - base of left thumb to radial 

tendon





- NEURO


Level of Consciousness: Awake, Alert


Motor/Sensory: No Motor Deficit, No Sensory Deficit





- DERM


Integumentary: Warm, Dry





Course





- Re-evaluation


Re-evalutation: 





11/12/17 10:28


X-rays negative per rad


11/12/17 10:28








- Vital Signs


Vital signs: 


 











Temp Pulse Resp BP Pulse Ox


 


 98.3 F   62   12   108/59 L  100 


 


 11/12/17 09:10  11/12/17 09:10  11/12/17 09:10  11/12/17 09:10  11/12/17 09:10














Procedures





- Immobilization


  ** Left Thumb


Time completed: 10:25


Pre-Proc Neuro Vasc Exam: Normal


Immobilizer type: Thumb spica


Performed by: PCT


Post-Proc Neuro Vasc Exam: Normal


Alignment checked and good: Yes





Discharge





- Discharge


Clinical Impression: 


 left thumb tendonitis





Condition: Good


Disposition: HOME, SELF-CARE


Instructions:  Anti-Inflammatory Medication ([facility]), Splint Precautions ([facility]), 

Temporary Splint ([facility]), Tendonitis ([facility])


Additional Instructions: 


splint few days


to er if worse


warm compress


tylenol


motrin


see orthopedic doctor if persists





Please complete the patient satisfaction survey if you get one, and return it.. 

If you do not receive a survey,  then you can go to the [facility] website, [email or web address] 

and place your comments about your very good care. Thank you very much. It was 

a pleasure being your medical provider today.


Prescriptions: 


Ibuprofen [Motrin 600 mg Tablet] 600 mg PO Q8HP PRN #30 tablet


 PRN Reason: 


Forms:  Return to Work


Referrals: 


ANA GASTON,  [ACTIVE STAFF] - Follow up as needed

## 2017-12-28 ENCOUNTER — HOSPITAL ENCOUNTER (EMERGENCY)
Dept: HOSPITAL 62 - ER | Age: 27
Discharge: HOME | End: 2017-12-28
Payer: SELF-PAY

## 2017-12-28 DIAGNOSIS — R09.89: ICD-10-CM

## 2017-12-28 DIAGNOSIS — R09.81: ICD-10-CM

## 2017-12-28 DIAGNOSIS — J06.9: Primary | ICD-10-CM

## 2017-12-28 DIAGNOSIS — R50.9: ICD-10-CM

## 2017-12-28 DIAGNOSIS — R05: ICD-10-CM

## 2017-12-28 PROCEDURE — 99283 EMERGENCY DEPT VISIT LOW MDM: CPT

## 2017-12-28 NOTE — ER DOCUMENT REPORT
HPI





- HPI


Pain Level: 3


Notes: 





Patient is a 27-year-old female with no significant past medical history who 

presents to the ED complaining of subjective fever, chills, nasal congestion/

discharge, body ache, dry nonproductive cough 2 days.  Patient states that she 

missed the last 2 days of work and needs a work note.  Patient states that she 

is eating and drinking, but does have decreased p.o. intake.  She has not been 

using any over-the-counter meds for her symptoms.  She is still urinating 

normally and having normal bowel movements.  Patient denies any smoking or IV 

drug use.  No other concerns or complaints at this time.  Denies any headache, 

neck pain, sore throat, chest pain, palpitations, syncope, shortness of breath, 

wheeze, dyspnea, abdominal pain, nausea/vomiting/diarrhea, urinary retention, 

dysuria, hematuria, loss of control of bowel or bladder, numbness/tingling, or 

rash.





- ROS


Notes: 





REVIEW OF SYSTEMS:


CONSTITUTIONAL :  see hpi


EENT:   see hpi


CARDIOVASCULAR:  Denies chest pain.  Denies palpitations or racing or irregular 

heart beat.  Denies ankle edema.


RESPIRATORY:  see hpi.  Denies shortness of breath, difficulty breathing, or 

wheezing.


GASTROINTESTINAL:  Denies abdominal pain or distention.  Denies nausea, vomiting

, or diarrhea.  Denies blood in vomitus, stools, or per rectum.  Denies black, 

tarry stools.  Denies constipation.  


GENITOURINARY:  Denies difficulty urinating, painful urination, burning, 

frequency, blood in urine, or discharge.


MUSCULOSKELETAL:  Denies back or neck pain or stiffness.  Denies joint pain or 

swelling.


SKIN:   Denies rash, lesions or sores.


NEUROLOGICAL:  Denies confusion or altered mental status.  Denies passing out 

or loss of consciousness.  Denies dizziness or lightheadedness.  Denies 

headache.  Denies weakness or paralysis or loss of use of either side.  Denies 

problems with gait or speech.  Denies sensory loss, numbness, or tingling.  

Denies seizures.


PSYCHIATRIC:  Denies anxiety or stress.  Denies depression, suicidal ideation, 

or homicidal ideation.





ALL OTHER SYSTEMS REVIEWED AND NEGATIVE.





Dictation was performed using Dragon voice recognition software





- CONSTITUTIONAL


Constitutional: REPORTS: Chills.  DENIES: Fever





- EENT


EENT: DENIES: Sore Throat, Ear Pain, Eye problems





- CARDIOVASCULAR


Cardiovascular: DENIES: Chest pain





- RESPIRATORY


Respiratory: REPORTS: Coughing - non-productive.  DENIES: Trouble Breathing





- GASTROINTESTINAL


Gastrointestinal: DENIES: Abdominal Pain





- REPRODUCTIVE


Reproductive: DENIES: Pregnant:





Past Medical History





- Social History


Smoking Status: Never Smoker


Chew tobacco use (# tins/day): No


Frequency of alcohol use: None


Drug Abuse: None


Family History: Reviewed & Not Pertinent


Patient has suicidal ideation: No


Patient has homicidal ideation: No


Pulmonary Medical History: Reports: Hx Bronchitis


Neurological Medical History: Reports: Hx Migraine


Renal/ Medical History: Denies: Hx Peritoneal Dialysis


Psychiatric Medical History: Reports: Hx Depression





- Immunizations


Hx Diphtheria, Pertussis, Tetanus Vaccination: Yes





Vertical Provider Document





- CONSTITUTIONAL


Agree With Documented VS: Yes


Notes: 





PHYSICAL EXAMINATION:





GENERAL: Well-appearing, well-nourished and in no acute distress.  A&Ox4





HEAD: Atraumatic, normocephalic.





EYES: Pupils equal round and reactive to light, extraocular movements intact, 

sclera anicteric, conjunctiva are normal.





ENT: EAC clear b/l.  TM's intact b/l without erythema, fluid, or perforation.  

Nares patent and with clear discharge.  oropharynx clear w/o exudate.  1+ 

tonsilar hypertrophy without erythema or exudate.  No palatine shift.  Uvula 

midline.  No tongue protrusion.  No drooling, hoarseness, or airway compromise.

  Moist mucous membranes.  No sinus tenderness.





NECK: Normal range of motion, supple without lymphadenopathy.  No rigidity/

meningismus.





LUNGS: Breath sounds clear to auscultation bilaterally and equal.  No wheezes 

rales or rhonchi.





HEART: Regular rate and rhythm without murmurs, rubs, gallops.





ABDOMEN: Soft, nontender, nondistended abdomen.  No guarding, no rebound.  No 

masses appreciated.  Normal bowel sounds present.  No CVA tenderness 

bilaterally.  No hepatosplenomegaly.





NEUROLOGICAL: Normal speech, normal gait.  Normal sensory, motor exams 





PSYCH: Normal mood, normal affect.





SKIN: Warm, Dry, normal turgor, no rashes or lesions noted.





- INFECTION CONTROL


TRAVEL OUTSIDE OF THE [place] IN LAST 30 DAYS: No





Course





- Re-evaluation


Re-evalutation: 





12/28/17 14:28


Patient is an afebrile, well-hydrated, 27-year-old female who presents to the 

ED with acute URI, suspect probable influenza or other virus.  Vitals are 

stable.  PE is otherwise unremarkable.  Patient is tolerating p.o. without 

difficulty.  Patient declined influenza testing.  Low suspicion for any ACS, PE

, pneumothorax, pericarditis, dissection, respiratory compromise, severe 

dehydration, sepsis, meningitis, or other systemic emergent condition at this 

time.  Patient is aware that her condition can change from initial presentation 

and she needs to monitor symptoms closely and seek medical attention for any 

acute changes.  Recommend conservative measures for symptoms.  Recheck with 

your PCM in 3-5 days.  Return to the ED with any worsening/concerning symptoms 

otherwise as reviewed in discharge.  Patient is in agreement.  Work note 

provided.





Discharge





- Discharge


Clinical Impression: 


 Acute URI





Condition: Stable


Disposition: HOME, SELF-CARE


Instructions:  Upper Respiratory Illness ([facility]), Influenza ([facility])


Additional Instructions: 


Maintain adequate fluid intake


tylenol/ibuprofen as needed


over the counter cold medication as needed for symptoms


Humidified air may help


Monitor symptoms closely


F/u:  with your PCM in 3-5 days for a recheck





Return to the ED with any fever, worsening pain, chest pain, palpitations, 

syncope, worsening HA, neck pain/stiffness, shortness of breath, wheezing, 

drooling, trouble swallowing/breathing, abdominal pain, n/v/d, rash, or 

worsening/concerning symptoms otherwise.


Forms:  Return to Work


Referrals: 


[facility] [Provider Group] - Follow up as needed


[facility] [Provider Group] - Follow up as needed

## 2018-01-16 ENCOUNTER — HOSPITAL ENCOUNTER (EMERGENCY)
Dept: HOSPITAL 62 - ER | Age: 28
Discharge: HOME | End: 2018-01-16
Payer: SELF-PAY

## 2018-01-16 VITALS — SYSTOLIC BLOOD PRESSURE: 112 MMHG | DIASTOLIC BLOOD PRESSURE: 56 MMHG

## 2018-01-16 DIAGNOSIS — M43.6: Primary | ICD-10-CM

## 2018-01-16 DIAGNOSIS — M54.2: ICD-10-CM

## 2018-01-16 PROCEDURE — 99283 EMERGENCY DEPT VISIT LOW MDM: CPT

## 2018-01-16 NOTE — ER DOCUMENT REPORT
HPI





- HPI


Patient complains to provider of: right neck pain


Onset: Other - 2 days ago when woke up


Quality of pain: Achy, Cramping


Pain Level: 4


Context: 





26 yo female c/o right neck pain when she woke up 2 days ago. Hurts to turn it. 

No fever. No headache. No radiculopathy.


Associated Symptoms: None


Exacerbated by: Movement


Relieved by: Denies


Similar symptoms previously: No


Recently seen / treated by doctor: No





- ROS


ROS below otherwise negative: Yes


Systems Reviewed and Negative: Yes All other systems reviewed and negative





- REPRODUCTIVE


Reproductive: DENIES: Pregnant:





Past Medical History





- General


Information source: Patient


Last Menstrual Period: 12-22-17





- Social History


Smoking Status: Never Smoker


Frequency of alcohol use: None


Drug Abuse: None


Lives with: Family


Family History: Reviewed & Not Pertinent


Pulmonary Medical History: Reports: Hx Bronchitis


Neurological Medical History: Reports: Hx Migraine


Renal/ Medical History: Denies: Hx Peritoneal Dialysis


Psychiatric Medical History: Reports: Hx Depression


Surgical Hx: Negative





- Immunizations


Hx Diphtheria, Pertussis, Tetanus Vaccination: Yes





Vertical Provider Document





- CONSTITUTIONAL


Agree With Documented VS: Yes


Exam Limitations: No Limitations


General Appearance: No Apparent Distress





- INFECTION CONTROL


TRAVEL OUTSIDE OF THE [place] IN LAST 30 DAYS: No





- HEENT


HEENT: Normal ENT Exam, Normocephalic





- NECK


Neck: negative: Lymphadenopathy-Left, Lymphadenopathy-Right


Notes: 





tender and tense with trapezius muscle, able to move head but it increases her 

pain





- RESPIRATORY


Respiratory: Breath Sounds Normal, No Respiratory Distress


O2 Sat by Pulse Oximetry: 99





- CARDIOVASCULAR


Cardiovascular: Regular Rate, Regular Rhythm





- MUSCULOSKELETAL/EXTREMETIES


Musculoskeletal/Extremeties: MAEW, FROM, Tender - see above





- NEURO


Level of Consciousness: Awake, Alert


Motor/Sensory: No Motor Deficit, No Sensory Deficit





- DERM


Integumentary: Warm, Dry, No Rash





Course





- Vital Signs


Vital signs: 


 











Temp Pulse Resp BP Pulse Ox


 


 97.7 F   72   20   112/56 L  99 


 


 01/16/18 06:51  01/16/18 06:51  01/16/18 06:51  01/16/18 06:51  01/16/18 06:51














Discharge





- Discharge


Clinical Impression: 


 Right torticollis





Condition: Good


Disposition: HOME, SELF-CARE


Instructions:  Acetaminophen, Anti-Inflammatory Medication ([facility]), Muscle 

Relaxers ([facility]), Myalagia (Muscle Pain) ([facility]), Torticollis ([facility]), Warm Packs ([facility]

)


Additional Instructions: 


warm compress


gentle range of motion


return to er if worsening of the symptoms


Prescriptions: 


Ibuprofen [Motrin 800 mg Tablet] 800 mg PO Q8HP PRN #30 tablet


 PRN Reason: 


Cyclobenzaprine HCl [Flexeril 10 Mg Tablet] 10 mg PO TIDP PRN #20 tablet


 PRN Reason: 


Forms:  Return to Work

## 2018-02-26 ENCOUNTER — HOSPITAL ENCOUNTER (EMERGENCY)
Dept: HOSPITAL 62 - ER | Age: 28
Discharge: HOME | End: 2018-02-26
Payer: SELF-PAY

## 2018-02-26 VITALS — DIASTOLIC BLOOD PRESSURE: 78 MMHG | SYSTOLIC BLOOD PRESSURE: 110 MMHG

## 2018-02-26 DIAGNOSIS — Z91.018: ICD-10-CM

## 2018-02-26 DIAGNOSIS — R07.89: Primary | ICD-10-CM

## 2018-02-26 LAB
ADD MANUAL DIFF: NO
ALBUMIN SERPL-MCNC: 4.2 G/DL (ref 3.5–5)
ALP SERPL-CCNC: 33 U/L (ref 38–126)
ALT SERPL-CCNC: 23 U/L (ref 9–52)
ANION GAP SERPL CALC-SCNC: 13 MMOL/L (ref 5–19)
AST SERPL-CCNC: 18 U/L (ref 14–36)
BASOPHILS # BLD AUTO: 0 10^3/UL (ref 0–0.2)
BASOPHILS NFR BLD AUTO: 0.2 % (ref 0–2)
BILIRUB DIRECT SERPL-MCNC: 0.2 MG/DL (ref 0–0.4)
BILIRUB SERPL-MCNC: 0.2 MG/DL (ref 0.2–1.3)
BUN SERPL-MCNC: 10 MG/DL (ref 7–20)
CALCIUM: 9.1 MG/DL (ref 8.4–10.2)
CHLORIDE SERPL-SCNC: 101 MMOL/L (ref 98–107)
CO2 SERPL-SCNC: 26 MMOL/L (ref 22–30)
EOSINOPHIL # BLD AUTO: 0 10^3/UL (ref 0–0.6)
EOSINOPHIL NFR BLD AUTO: 1.5 % (ref 0–6)
ERYTHROCYTE [DISTWIDTH] IN BLOOD BY AUTOMATED COUNT: 13.9 % (ref 11.5–14)
GLUCOSE SERPL-MCNC: 76 MG/DL (ref 75–110)
HCT VFR BLD CALC: 34.6 % (ref 36–47)
HGB BLD-MCNC: 11.2 G/DL (ref 12–15.5)
LYMPHOCYTES # BLD AUTO: 1.5 10^3/UL (ref 0.5–4.7)
LYMPHOCYTES NFR BLD AUTO: 49.6 % (ref 13–45)
MCH RBC QN AUTO: 26.8 PG (ref 27–33.4)
MCHC RBC AUTO-ENTMCNC: 32.5 G/DL (ref 32–36)
MCV RBC AUTO: 83 FL (ref 80–97)
MONOCYTES # BLD AUTO: 0.3 10^3/UL (ref 0.1–1.4)
MONOCYTES NFR BLD AUTO: 9.8 % (ref 3–13)
NEUTROPHILS # BLD AUTO: 1.2 10^3/UL (ref 1.7–8.2)
NEUTS SEG NFR BLD AUTO: 38.9 % (ref 42–78)
PLATELET # BLD: 166 10^3/UL (ref 150–450)
POTASSIUM SERPL-SCNC: 3.9 MMOL/L (ref 3.6–5)
PROT SERPL-MCNC: 7.9 G/DL (ref 6.3–8.2)
RBC # BLD AUTO: 4.19 10^6/UL (ref 3.72–5.28)
SODIUM SERPL-SCNC: 140.1 MMOL/L (ref 137–145)
TOTAL CELLS COUNTED % (AUTO): 100 %
WBC # BLD AUTO: 3 10^3/UL (ref 4–10.5)

## 2018-02-26 PROCEDURE — 93010 ELECTROCARDIOGRAM REPORT: CPT

## 2018-02-26 PROCEDURE — 71046 X-RAY EXAM CHEST 2 VIEWS: CPT

## 2018-02-26 PROCEDURE — 84484 ASSAY OF TROPONIN QUANT: CPT

## 2018-02-26 PROCEDURE — 99285 EMERGENCY DEPT VISIT HI MDM: CPT

## 2018-02-26 PROCEDURE — 93005 ELECTROCARDIOGRAM TRACING: CPT

## 2018-02-26 PROCEDURE — 36415 COLL VENOUS BLD VENIPUNCTURE: CPT

## 2018-02-26 PROCEDURE — 85025 COMPLETE CBC W/AUTO DIFF WBC: CPT

## 2018-02-26 PROCEDURE — 96372 THER/PROPH/DIAG INJ SC/IM: CPT

## 2018-02-26 PROCEDURE — 80053 COMPREHEN METABOLIC PANEL: CPT

## 2018-02-26 NOTE — EKG REPORT
SEVERITY:- ABNORMAL ECG -

SINUS RHYTHM

FIRST DEGREE AV BLOCK

:

Confirmed by: Chetan Valerio MD 26-Feb-2018 13:51:02

## 2018-02-26 NOTE — ER DOCUMENT REPORT
ED General





- General


Chief Complaint: Chest Tightness


Stated Complaint: CHEST CONGESTION


Time Seen by Provider: 02/26/18 11:37


Mode of Arrival: Ambulatory


Information source: Patient


Notes: 





27 years old female who was lifting her knees last few days presents today with 

pain over the left chest wall since Sunday.  Each time she moves pain increases 

in intensity pain start at the medial border of the scapula and run along the 

dermatomal pattern to the sternum.  Movement make it worse.  Denies any 

difficulty in breathing.  Denies any fever chills cough or other constitutional 

symptoms.


TRAVEL OUTSIDE OF THE [place] IN LAST 30 DAYS: No





- Related Data


Allergies/Adverse Reactions: 


 





pineapple [Pineapple] Allergy (Verified 02/26/18 11:16)


 











Past Medical History





- Social History


Smoking Status: Never Smoker


Cigarette use (# per day): No


Chew tobacco use (# tins/day): No


Smoking Education Provided: No


Frequency of alcohol use: None


Drug Abuse: None


Family History: Reviewed & Not Pertinent


Patient has suicidal ideation: No


Patient has homicidal ideation: No


Pulmonary Medical History: Reports: Hx Bronchitis


Neurological Medical History: Reports: Hx Migraine


Renal/ Medical History: Denies: Hx Peritoneal Dialysis


Psychiatric Medical History: Reports: Hx Depression





- Immunizations


Hx Diphtheria, Pertussis, Tetanus Vaccination: Yes





Review of Systems





- Review of Systems


Constitutional: denies: No symptoms reported, See HPI, Chills, Diaphoresis, 

Fever, Malaise, Weakness, Other, Weight gain, Weight loss, Recent illness


EENT: denies: No symptoms reported, See HPI, Eye pain, Eye discharge, Blurred 

vision, Tearing, Double vision, Ear pain, Ear discharge, Nose pain, Nose 

congestion, Nose discharge, Sinus pressure, Sinus discharge, Throat pain, 

Difficulty swallowing, Throat swelling, Mouth pain, Mouth swelling, Dental 

problem, Vertigo, Other


Cardiovascular: Chest pain.  denies: No symptoms reported, See HPI, Palpitations

, Heart racing, Orthopnea, Dyspnea, Syncope, Dizziness, Lightheaded, Edema, 

Other, Paroxysmal Nocturnal Dysp


Respiratory: denies: No symptoms reported, See HPI, Cough, Hurts to breathe, 

Hemoptysis, Short of breath, Sputum, Stridor, Wheezing, Other


Gastrointestinal: denies: No symptoms reported, See HPI, Abdomen distended, 

Abdominal pain, Diarrhea, Nausea, Vomiting, Constipation, Blood streaked bowels

, Poor appetite, Poor fluid intake, Blood in vomit, Black stools, Rectal 

bleeding, Last bowel movement, Fecal incontinence, Other


Genitourinary: denies: No symptoms reported, See HPI, Burning, Dysuria, 

Discharge, Frequency, Flank pain, Hematuria, Incontinence, Pain, Urgency, 

Retention, Other


Female Genitourinary: denies: No symptoms reported, See HPI, Last menstrual 

period, Pregnant, Post menopausal, Heavy/abnormal periods, Irregular period, 

Vaginal bleeding, Vaginal discharge, Vaginal odor, Painful intercourse, Other


Musculoskeletal: denies: No symptoms reported, See HPI, Back pain, Gout, Joint 

pain, Joint swelling, Muscle pain, Muscle stiffness, Neck pain, Deformity, Leg 

swelling, Ankle swelling, Other


Skin: denies: No symptoms reported, See HPI, Change in color, Change in hair/

nails, Dryness, Lesions, Lumps, Rash, Other


Hematologic/Lymphatic: denies: No symptoms reported, See HPI, Anemia, Blood 

clots, Easy bleeding, Easy bruising, Enlarged lymph nodes, Swollen glands, Other





Physical Exam





- Vital signs


Vitals: 





 











Temp Pulse Resp BP Pulse Ox


 


 98.0 F   82   17   119/66   99 


 


 02/26/18 11:08  02/26/18 11:08  02/26/18 11:08  02/26/18 11:08  02/26/18 11:08














- Notes


Notes: 





PHYSICAL EXAMINATION:





GENERAL: Well-appearing, well-nourished and in no acute distress.





HEAD: Atraumatic, normocephalic.





EYES: Pupils equal round and reactive to light, extraocular movements intact, 

conjunctiva are normal.





ENT: Nares patent, oropharynx clear without exudates.  Moist mucous membranes.





NECK: Normal range of motion, supple without lymphadenopathy





LUNGS: Breath sounds clear to auscultation bilaterally and equal.  No wheezes 

rales or rhonchi.


Sharp chest wall tenderness noted along the sixth dermatomal pattern over the 

ribs


All the way from the scapular region to the sternum.  And change of position 

increases the intensity of pain.





HEART: Regular rate and rhythm without murmurs





ABDOMEN: Soft, nontender, nondistended abdomen.  No guarding, no rebound.  No 

masses appreciated.





Female : deferred





Musculoskeletal: Normal range of motion, no pitting or edema.  No cyanosis.





NEUROLOGICAL: Cranial nerves grossly intact.  Normal speech, normal gait.  

Normal sensory, motor exams





PSYCH: Normal mood, normal affect.





SKIN: Warm, Dry, normal turgor, no rashes or lesions noted.





Course





- Re-evaluation


Re-evalutation: 





02/26/18 12:56


She was taught how to do the change of position exercise.





- Vital Signs


Vital signs: 





 











Temp Pulse Resp BP Pulse Ox


 


 98.0 F   82   17   119/66   99 


 


 02/26/18 11:08  02/26/18 11:08  02/26/18 11:08  02/26/18 11:08  02/26/18 11:08














- Laboratory


Result Diagrams: 


 02/26/18 11:54





 02/26/18 11:54


Laboratory results interpreted by me: 





 











  02/26/18 02/26/18





  11:54 11:54


 


WBC  3.0 L 


 


Hgb  11.2 L 


 


Hct  34.6 L 


 


MCH  26.8 L 


 


Seg Neutrophils %  38.9 L 


 


Lymphocytes %  49.6 H 


 


Absolute Neutrophils  1.2 L 


 


Alkaline Phosphatase   33 L














Discharge





- Discharge


Clinical Impression: 


 Chest wall pain





Disposition: HOME, SELF-CARE


Instructions:  Chest Wall Pain ([facility])


Prescriptions: 


Baclofen [Baclofen 10 mg Tablet] 10 mg PO TID #90 tab


Diclofenac Sodium 50 mg PO TID #30 tablet.

## 2018-02-26 NOTE — ER DOCUMENT REPORT
ED Medical Screen (RME)





- General


Chief Complaint: Chest Tightness


Stated Complaint: CHEST CONGESTION


Time Seen by Provider: 02/26/18 11:37


Notes: 





chest pressure/sob. no uri sx's


TRAVEL OUTSIDE OF THE [place] IN LAST 30 DAYS: No





- Related Data


Allergies/Adverse Reactions: 


 





pineapple [Pineapple] Allergy (Verified 02/26/18 11:16)


 











Past Medical History





- Social History


Frequency of alcohol use: None


Drug Abuse: None


Family history: None


Pulmonary Medical History: Reports: Hx Bronchitis


Neurological Medical History: Reports: Hx Migraine


Renal/ Medical History: Denies: Hx Peritoneal Dialysis


Psychiatric Medical History: Reports: Hx Depression





- Immunizations


Hx Diphtheria, Pertussis, Tetanus Vaccination: Yes





Physical Exam





- Vital signs


Vitals: 





 











Temp Pulse Resp BP Pulse Ox


 


 98.0 F   82   17   119/66   99 


 


 02/26/18 11:08  02/26/18 11:08  02/26/18 11:08  02/26/18 11:08  02/26/18 11:08














Course





- Vital Signs


Vital signs: 





 











Temp Pulse Resp BP Pulse Ox


 


 98.0 F   82   17   119/66   99 


 


 02/26/18 11:08  02/26/18 11:08  02/26/18 11:08  02/26/18 11:08  02/26/18 11:08

## 2018-02-26 NOTE — RADIOLOGY REPORT (SQ)
EXAM DESCRIPTION:  CHEST PA/LAT



COMPLETED DATE/TIME:  2/26/2018 12:05 pm



REASON FOR STUDY:  chest pressure



COMPARISON:  7/14/2015



EXAM PARAMETERS:  NUMBER OF VIEWS: two views

TECHNIQUE: Digital Frontal and Lateral radiographic views of the chest acquired.

RADIATION DOSE: NA

LIMITATIONS: none



FINDINGS:  LUNGS AND PLEURA: No opacities, masses or pneumothorax. No pleural effusion.

MEDIASTINUM AND HILAR STRUCTURES: No masses or contour abnormalities.

HEART AND VASCULAR STRUCTURES: Heart normal size.  No evidence for failure.

BONES: No acute findings.

HARDWARE: None in the chest.

OTHER: No other significant finding.



IMPRESSION:  NO SIGNIFICANT RADIOGRAPHIC FINDING IN THE CHEST.



TECHNICAL DOCUMENTATION:  JOB ID:  [id number]

 2011 [employer]- All Rights Reserved



Reading location - IP/workstation name: [email or web address]

## 2018-03-21 ENCOUNTER — HOSPITAL ENCOUNTER (EMERGENCY)
Dept: HOSPITAL 62 - ER | Age: 28
Discharge: HOME | End: 2018-03-21
Payer: SELF-PAY

## 2018-03-21 VITALS — SYSTOLIC BLOOD PRESSURE: 114 MMHG | DIASTOLIC BLOOD PRESSURE: 71 MMHG

## 2018-03-21 DIAGNOSIS — Z79.3: ICD-10-CM

## 2018-03-21 DIAGNOSIS — R07.89: Primary | ICD-10-CM

## 2018-03-21 LAB
ADD MANUAL DIFF: NO
ALBUMIN SERPL-MCNC: 4.6 G/DL (ref 3.5–5)
ALP SERPL-CCNC: 35 U/L (ref 38–126)
ALT SERPL-CCNC: 26 U/L (ref 9–52)
ANION GAP SERPL CALC-SCNC: 10 MMOL/L (ref 5–19)
APPEARANCE UR: (no result)
APTT PPP: YELLOW S
AST SERPL-CCNC: 20 U/L (ref 14–36)
BASOPHILS # BLD AUTO: 0 10^3/UL (ref 0–0.2)
BASOPHILS NFR BLD AUTO: 0.2 % (ref 0–2)
BILIRUB DIRECT SERPL-MCNC: 0.3 MG/DL (ref 0–0.4)
BILIRUB SERPL-MCNC: 0.3 MG/DL (ref 0.2–1.3)
BILIRUB UR QL STRIP: NEGATIVE
BUN SERPL-MCNC: 11 MG/DL (ref 7–20)
CALCIUM: 9.9 MG/DL (ref 8.4–10.2)
CHLORIDE SERPL-SCNC: 106 MMOL/L (ref 98–107)
CO2 SERPL-SCNC: 24 MMOL/L (ref 22–30)
EOSINOPHIL # BLD AUTO: 0 10^3/UL (ref 0–0.6)
EOSINOPHIL NFR BLD AUTO: 0.8 % (ref 0–6)
ERYTHROCYTE [DISTWIDTH] IN BLOOD BY AUTOMATED COUNT: 13.9 % (ref 11.5–14)
GLUCOSE SERPL-MCNC: 82 MG/DL (ref 75–110)
GLUCOSE UR STRIP-MCNC: NEGATIVE MG/DL
HCT VFR BLD CALC: 38 % (ref 36–47)
HGB BLD-MCNC: 12.3 G/DL (ref 12–15.5)
KETONES UR STRIP-MCNC: (no result) MG/DL
LYMPHOCYTES # BLD AUTO: 1.8 10^3/UL (ref 0.5–4.7)
LYMPHOCYTES NFR BLD AUTO: 44.8 % (ref 13–45)
MCH RBC QN AUTO: 26.9 PG (ref 27–33.4)
MCHC RBC AUTO-ENTMCNC: 32.4 G/DL (ref 32–36)
MCV RBC AUTO: 83 FL (ref 80–97)
MONOCYTES # BLD AUTO: 0.4 10^3/UL (ref 0.1–1.4)
MONOCYTES NFR BLD AUTO: 10.8 % (ref 3–13)
NEUTROPHILS # BLD AUTO: 1.7 10^3/UL (ref 1.7–8.2)
NEUTS SEG NFR BLD AUTO: 43.4 % (ref 42–78)
NITRITE UR QL STRIP: NEGATIVE
PH UR STRIP: 7 [PH] (ref 5–9)
PLATELET # BLD: 180 10^3/UL (ref 150–450)
POTASSIUM SERPL-SCNC: 4.5 MMOL/L (ref 3.6–5)
PROT SERPL-MCNC: 8.3 G/DL (ref 6.3–8.2)
PROT UR STRIP-MCNC: NEGATIVE MG/DL
RBC # BLD AUTO: 4.59 10^6/UL (ref 3.72–5.28)
SODIUM SERPL-SCNC: 140.1 MMOL/L (ref 137–145)
SP GR UR STRIP: 1.02
TOTAL CELLS COUNTED % (AUTO): 100 %
UROBILINOGEN UR-MCNC: 2 MG/DL (ref ?–2)
WBC # BLD AUTO: 4 10^3/UL (ref 4–10.5)

## 2018-03-21 PROCEDURE — 96372 THER/PROPH/DIAG INJ SC/IM: CPT

## 2018-03-21 PROCEDURE — 81025 URINE PREGNANCY TEST: CPT

## 2018-03-21 PROCEDURE — 93005 ELECTROCARDIOGRAM TRACING: CPT

## 2018-03-21 PROCEDURE — 85025 COMPLETE CBC W/AUTO DIFF WBC: CPT

## 2018-03-21 PROCEDURE — 80053 COMPREHEN METABOLIC PANEL: CPT

## 2018-03-21 PROCEDURE — 84484 ASSAY OF TROPONIN QUANT: CPT

## 2018-03-21 PROCEDURE — 71046 X-RAY EXAM CHEST 2 VIEWS: CPT

## 2018-03-21 PROCEDURE — 81001 URINALYSIS AUTO W/SCOPE: CPT

## 2018-03-21 PROCEDURE — 36415 COLL VENOUS BLD VENIPUNCTURE: CPT

## 2018-03-21 PROCEDURE — 99285 EMERGENCY DEPT VISIT HI MDM: CPT

## 2018-03-21 PROCEDURE — 85379 FIBRIN DEGRADATION QUANT: CPT

## 2018-03-21 PROCEDURE — 93010 ELECTROCARDIOGRAM REPORT: CPT

## 2018-03-21 NOTE — RADIOLOGY REPORT (SQ)
EXAM DESCRIPTION:  CHEST PA/LAT



COMPLETED DATE/TIME:  3/21/2018 3:28 pm



REASON FOR STUDY:  chest pain



COMPARISON:  Two-view chest 2/26/2018



EXAM PARAMETERS:  NUMBER OF VIEWS: two views

TECHNIQUE: Digital Frontal and Lateral radiographic views of the chest acquired.

RADIATION DOSE: NA

LIMITATIONS: none



FINDINGS:  LUNGS AND PLEURA: No opacities, masses or pneumothorax. No pleural effusion.

MEDIASTINUM AND HILAR STRUCTURES: No masses or contour abnormalities.

HEART AND VASCULAR STRUCTURES: Heart normal size.  No evidence for failure.

BONES: No acute findings.

HARDWARE: None in the chest.

OTHER: No other significant finding.



IMPRESSION:  NO SIGNIFICANT RADIOGRAPHIC FINDING IN THE CHEST.



TECHNICAL DOCUMENTATION:  JOB ID:  [id number]

 2011 [employer]- All Rights Reserved



Reading location - IP/workstation name: [email or web address]

## 2018-03-21 NOTE — ER DOCUMENT REPORT
HPI





- HPI


Pain Level: 4


Notes: 





Patient is a 27-year-old female with no significant past medical history who 

presents to the ED complaining of left chest wall pain 2 days.  Patient states 

that the pain is worsened by pushing and by twisting movements.  Patient states 

that when she takes a deep breath she does notice the pain which is what she is 

calling short of breath.  Patient states that she is otherwise ambulatory 

without any dyspnea on exertion or worsening symptoms.  Patient states that she 

had this same pain about a month ago and was told it was in her ribs.  Patient 

states that the pain did resolve, but returned.  Patient does not recall any 

lifting or other injury that exacerbated her symptoms.  Patient has not had any 

distance travel, prolonged immobilization, recent surgery/trauma, radius DVT/PE

, smoking.  Patient does admit to being on birth control with the Depo-Provera 

shot.  Patient states that she is otherwise eating and drinking without 

difficulties.  She is urinating normally and having normal bowel movements.  No 

other concerns or complaints at this time.  Denies any headache, fever, neck 

pain, URI, sore throat, chest pain, palpitations, syncope, cough, shortness of 

breath, wheeze, dyspnea, abdominal pain, nausea/vomiting/diarrhea, urinary 

retention, dysuria, hematuria, loss of control of bowel or bladder, numbness/

tingling, saddle anesthesia, muscle paralysis/weakness, or rash.





- ROS


Systems Reviewed and Negative: Yes All other systems reviewed and negative





- REPRODUCTIVE


Reproductive: DENIES: Pregnant:





- DERM


Skin Color: Normal





Past Medical History





- Social History


Smoking Status: Never Smoker


Chew tobacco use (# tins/day): No


Frequency of alcohol use: None


Drug Abuse: None


Family History: Reviewed & Not Pertinent


Patient has suicidal ideation: No


Patient has homicidal ideation: No


Pulmonary Medical History: Reports: Hx Bronchitis


Neurological Medical History: Reports: Hx Migraine


Renal/ Medical History: Denies: Hx Peritoneal Dialysis


Psychiatric Medical History: Reports: Hx Depression





- Immunizations


Hx Diphtheria, Pertussis, Tetanus Vaccination: Yes





Vertical Provider Document





- CONSTITUTIONAL


Agree With Documented VS: Yes


Notes: 





PHYSICAL EXAMINATION:





GENERAL: Well-appearing, well-nourished and in no acute distress.





HEAD: Atraumatic, normocephalic.





EYES: Pupils equal round and reactive to light, extraocular movements intact, 

sclera anicteric, conjunctiva are normal.





ENT:  Nares patent and without discharge.  oropharynx clear without exudates.  

No tonsilar hypertrophy or erythema.  Moist mucous membranes.  





NECK: Normal range of motion, supple without lymphadenopathy





Chest:  Moderate tenderness to the left superior chest wall, correlates with 

pain described per patient.  Equal rise/fall.  No flail chest.  Pain elicited 

with movement as well.





LUNGS: Breath sounds clear to auscultation bilaterally and equal.  No wheezes 

rales or rhonchi.





HEART: Regular rate and rhythm without murmurs, rubs, gallops.





ABDOMEN: Soft, nontender, nondistended abdomen.  No guarding, no rebound.  No 

masses appreciated.  Normal bowel sounds present.  No CVA tenderness 

bilaterally.





Musculoskeletal: FROM to passive/active. Strength 5+/5.  Catracho neg b/l.  No 

calf erythema/asymmetry/swelling/tenderness b/l. 





Extremities:  No cyanosis, clubbing, or edema b/l.  Peripheral pulses 2+.  

Capillary refill less than 3 seconds.





NEUROLOGICAL: Normal speech, normal gait.  Normal sensory, motor exams 





PSYCH: Normal mood, normal affect.





SKIN: Warm, Dry, normal turgor, no rashes or lesions noted.





- INFECTION CONTROL


TRAVEL OUTSIDE OF THE [place] IN LAST 30 DAYS: No





Course





- Re-evaluation


Re-evalutation: 





03/21/18 17:49


Patient is an afebrile, well-hydrated, 27-year-old female who presents to the 

ED with chest wall pain based on H&P today.  Vitals are acceptable.  PE is 

otherwise unremarkable aside from the reproducible chest wall tenderness.  

Chest wall tenderness was also elicited with movement.  Labs ordered from pit: 

CBC, CMP, d-dimer, UA/HCG, cardiac enzymes were unremarkable for any acute 

pathology.  Chest x-ray and EKG were also unremarkable for any acute pathology.

  Patient was given Toradol and Lidoderm patch which improved her symptoms.  

Low suspicion for any ACS, PE, pneumothorax, pericarditis, dissection, 

respiratory compromise, severe dehydration, sepsis, meningitis, or other 

systemic emergent condition at this time.  Patient is aware that her condition 

can change from initial presentation and she needs to monitor symptoms closely 

and seek medical attention for any acute changes.  Recommend conservative 

measures for symptoms.  Recheck with your PCM in 3-5 days.  Return to the ED 

with any worsening/concerning symptoms otherwise as reviewed in discharge.  

Patient is in agreement.











- Vital Signs


Vital signs: 


 











Temp Pulse Resp BP Pulse Ox


 


 98.8 F   67   20   112/70   100 


 


 03/21/18 14:15  03/21/18 14:15  03/21/18 14:15  03/21/18 14:15  03/21/18 14:15














- Laboratory


Result Diagrams: 


 03/21/18 16:34





 03/21/18 16:34





Discharge





- Discharge


Clinical Impression: 


 Chest wall pain





Condition: Stable


Disposition: HOME, SELF-CARE


Instructions:  Chest Wall Pain ([facility])


Additional Instructions: 


Rest, Ice


Tylenol/ibuprofen as needed


Light stretches daily


Strength exercises as able


Moist heat and massage may help


F/u with your PCP in 3-5 days for a recheck


Consider consult(s) with Orthopedics/physical therapy for ongoing/worsening 

symptoms





Return to the ED with any worsening symptoms and/or development of fever, 

headache, chest pain, palpitations, syncope, shortness of breath, trouble 

breathing, abdominal pain, n/v/d, muscle weakness/paralysis, numbness/tingling, 

swelling, redness, or other worsening symptoms that are concerning to you.


Referrals: 


[facility] [Provider Group] - Follow up as needed


[facility] [Provider Group] - Follow up as needed


[facility] [Provider Group] - Follow up as needed

## 2018-03-21 NOTE — ER DOCUMENT REPORT
ED Medical Screen (RME)





- General


Chief Complaint: Chest Wall Pain


Stated Complaint: CHEST PAIN


Time Seen by Provider: 03/21/18 15:33


Notes: 





pt with left sided cp. non smoke, on depo.  no pmh.


TRAVEL OUTSIDE OF THE [place] IN LAST 30 DAYS: No





- Related Data


Allergies/Adverse Reactions: 


 





No Known Drug Allergies Allergy (Verified 03/21/18 15:32)


 


pineapple [Pineapple] Allergy (Verified 03/21/18 15:32)


 











Past Medical History





- Social History


Chew tobacco use (# tins/day): No


Frequency of alcohol use: None


Drug Abuse: None


Family history: None


Pulmonary Medical History: Reports: Hx Bronchitis


Neurological Medical History: Reports: Hx Migraine


Renal/ Medical History: Denies: Hx Peritoneal Dialysis


Psychiatric Medical History: Reports: Hx Depression





- Immunizations


Hx Diphtheria, Pertussis, Tetanus Vaccination: Yes





Physical Exam





- Vital signs


Vitals: 





 











Temp Pulse Resp BP Pulse Ox


 


 98.8 F   67   20   112/70   100 


 


 03/21/18 14:15  03/21/18 14:15  03/21/18 14:15  03/21/18 14:15  03/21/18 14:15














Course





- Vital Signs


Vital signs: 





 











Temp Pulse Resp BP Pulse Ox


 


 98.8 F   67   20   112/70   100 


 


 03/21/18 14:15  03/21/18 14:15  03/21/18 14:15  03/21/18 14:15  03/21/18 14:15

## 2018-05-21 ENCOUNTER — HOSPITAL ENCOUNTER (EMERGENCY)
Dept: HOSPITAL 62 - ER | Age: 28
Discharge: HOME | End: 2018-05-21
Payer: SELF-PAY

## 2018-05-21 VITALS — SYSTOLIC BLOOD PRESSURE: 117 MMHG | DIASTOLIC BLOOD PRESSURE: 65 MMHG

## 2018-05-21 DIAGNOSIS — Z91.018: ICD-10-CM

## 2018-05-21 DIAGNOSIS — J34.89: ICD-10-CM

## 2018-05-21 DIAGNOSIS — R06.7: ICD-10-CM

## 2018-05-21 DIAGNOSIS — R09.81: ICD-10-CM

## 2018-05-21 DIAGNOSIS — J02.9: ICD-10-CM

## 2018-05-21 DIAGNOSIS — R50.9: ICD-10-CM

## 2018-05-21 DIAGNOSIS — R09.82: ICD-10-CM

## 2018-05-21 DIAGNOSIS — J06.9: Primary | ICD-10-CM

## 2018-05-21 DIAGNOSIS — R51: ICD-10-CM

## 2018-05-21 DIAGNOSIS — R05: ICD-10-CM

## 2018-05-21 PROCEDURE — 99283 EMERGENCY DEPT VISIT LOW MDM: CPT

## 2018-05-21 NOTE — ER DOCUMENT REPORT
ED Respiratory Problem





- General


Chief Complaint: Sinus Congestion


Stated Complaint: DIZZINESS,HEADACHE


Time Seen by Provider: 05/21/18 17:15


Mode of Arrival: Ambulatory


Information source: Patient


Notes: 





27-year-old female presents to ED for complaint of nasal pain and congestion 

pressure headache and sore throat 2 days.  She states she has had some cough 

on and off as well as sneezing.  She states she had a temperature this morning 

of 100.  She is alert oriented, pupils equal and reactive light, respirations 

regular even unlabored, speaking in full sentences with a normal voice, walks 

with a even steady gait.


TRAVEL OUTSIDE OF THE [place] IN LAST 30 DAYS: No





- HPI


Patient complains to provider of: Cough


Onset: Other - 2 days


Initiating Event: URI


Quality of pain: Achy - Headache, Other - Crutches sore throat


Severity: Moderate


Pain Level: 3


Context: denies: Smoker


Cough: Nonproductive


Sputum amount: None


Associated symptoms: Chills, Congestion, Cough, Headache, PND, Runny nose, 

Sinus pain/pressure, Sore Throat


Similar symptoms previously: Yes


Recently seen / treated by doctor: No





- Related Data


Allergies/Adverse Reactions: 


 





No Known Drug Allergies Allergy (Verified 03/21/18 15:32)


 


pineapple [Pineapple] Allergy (Verified 03/21/18 15:32)


 











Past Medical History





- General


Information source: Patient





- Social History


Smoking Status: Never Smoker


Cigarette use (# per day): No


Chew tobacco use (# tins/day): No


Smoking Education Provided: No


Frequency of alcohol use: None


Drug Abuse: None


Family History: Reviewed & Not Pertinent


Patient has suicidal ideation: No


Patient has homicidal ideation: No





- Past Medical History


Cardiac Medical History: Reports: None


Pulmonary Medical History: Reports: Hx Bronchitis


EENT Medical History: Reports: None


Neurological Medical History: Reports: Hx Migraine


Endocrine Medical History: Reports: None


Renal/ Medical History: Reports: None


Malignancy Medical History: Reports: None


GI Medical History: Reports: None


Musculoskeltal Medical History: Reports None


Skin Medical History: Reports None


Psychiatric Medical History: Reports: Hx Depression


Traumatic Medical History: Reports: None


Infectious Medical History: Reports: None


Surgical Hx: Negative


Past Surgical History: Reports: None





- Immunizations


Hx Diphtheria, Pertussis, Tetanus Vaccination: Yes





Review of Systems





- Review of Systems


Constitutional: Chills, Fever, Recent illness


EENT: No symptoms reported, Nose discharge, Sinus discharge


Cardiovascular: No symptoms reported


Respiratory: Cough


Gastrointestinal: No symptoms reported


Genitourinary: No symptoms reported


Female Genitourinary: No symptoms reported


Musculoskeletal: No symptoms reported


Skin: No symptoms reported


Hematologic/Lymphatic: No symptoms reported


Neurological/Psychological: No symptoms reported


-: Yes All other systems reviewed and negative





Physical Exam





- Vital signs


Vitals: 





 











Temp Pulse Resp BP Pulse Ox


 


 98.5 F   63   18   115/71   100 


 


 05/21/18 14:50  05/21/18 14:50  05/21/18 14:50  05/21/18 14:50  05/21/18 14:50











Interpretation: Normal





- General


General appearance: Appears well, Alert





- HEENT


Head: Normocephalic, Atraumatic


Eyes: Normal


Pupils: PERRL


Ears: Normal


External canal: Normal


Tympanic membrane: Normal


Sinus: Normal


Nasal: Purulent discharge, Swelling


Mouth/Lips: Normal


Mucous membranes: Normal


Pharynx: Post nasal drainage


Neck: Normal





- Respiratory


Respiratory status: No respiratory distress


Chest status: Nontender


Breath sounds: Nonproductive cough.  No: Productive cough, Rales, Rhonchi, 

Stridor, Wheezing


Chest palpation: Normal





- Cardiovascular


Rhythm: Regular


Heart sounds: Normal auscultation


Murmur: No





- Abdominal


Inspection: Normal


Distension: No distension


Bowel sounds: Normal


Tenderness: Nontender


Organomegaly: No organomegaly





- Back


Back: Normal, Nontender





- Extremities


General upper extremity: Normal inspection, Nontender, Normal color, Normal ROM

, Normal temperature


General lower extremity: Normal inspection, Nontender, Normal color, Normal ROM

, Normal temperature, Normal weight bearing.  No: Catracho's sign





- Neurological


Neuro grossly intact: Yes


Cognition: Normal


Orientation: AAOx4


[place] Coma Scale Eye Opening: Spontaneous


[place] Coma Scale Verbal: Oriented


[place] Coma Scale Motor: Obeys Commands


[place]ov[place] Coma Scale Total: 15


Speech: Normal


Motor strength normal: LUE, RUE, LLE, RLE


Sensory: Normal





- Psychological


Associated symptoms: Normal affect, Normal mood





- Skin


Skin Temperature: Warm


Skin Moisture: Dry


Skin Color: Normal





Course





- Re-evaluation


Re-evalutation: 





05/21/18 17:29


After performing a Medical Screening Examination, I estimate there is LOW risk 

for ACUTE CORONARY SYNDROME, RESPIRATORY FAILURE, SEPSIS OR MENINGITIS, thus I 

consider the discharge disposition reasonable.  I have reevaluated this patient 

multiple times and no significant life threatening changes are noted. The 

patient and I have discussed the diagnosis and risks, and we agree with 

discharging home with close follow-up. We also discussed returning to the 

Emergency Department immediately if new or worsening symptoms occur. We have 

discussed the symptoms which are most concerning (e.g., changing or worsening 

pain, trouble swallowing or breathing, neck stiffness, fever) that necessitate 

immediate return.


She was treated with Claritin 10 mg, Sudafed 30 mg, and Mucinex 600 mg in the 

ED.





- Vital Signs


Vital signs: 





 











Temp Pulse Resp BP Pulse Ox


 


 98.5 F   63   18   115/71   100 


 


 05/21/18 14:50  05/21/18 14:50  05/21/18 14:50  05/21/18 14:50  05/21/18 14:50














Discharge





- Discharge


Clinical Impression: 


URI (upper respiratory infection)


Qualifiers:


 URI type: unspecified URI Qualified Code(s): J06.9 - Acute upper respiratory 

infection, unspecified





Condition: Stable


Disposition: HOME, SELF-CARE


Instructions:  Family Physicians / Practices


Additional Instructions: 


UPPER RESPIRATORY ILLNESS:





     You have a viral infection of the respiratory passages -- a "cold."  This 

common infection causes nasal congestion, drainage, and often sore throat and 

cough.  It is highly contagious.  The disease usually lasts about 10 to 14 days.


     There is no "cure" for the viral infection -- it must run its course.  If 

there is a complication, such as bacterial infection in the nose, sinuses, 

middle ear, or bronchial tubes, antibiotics may be required.  The antibiotics 

won't affect the virus.


     Drink plenty of fluids.  A humidifier may help.  An expectorant medication 

or decongestant may make you more comfortable.  Use acetaminophen or ibuprofen 

for fever or aches.


     See the doctor if fever persists over two days, if there is any 

significant worsening of your symptoms, or if you simply fail to improve as 

expected.





DECONGESTANT MEDICATION:


     A decongestant medicine has been prescribed.  Often this medicine is 

combined in the same tablet with an antihistamine or expectorant. This type of 

medicine is helpful in treating a bad cold or sinus condition, as well as in 

treatment of the nasal congestion of hay fever.  It is not of much benefit for 

lung infections.


     Decongestant medicines are related to stimulants.  They can cause an 

increase in blood pressure and heart rate.  Persons with heart disease and high 

blood pressure should not take decongestants without discussing this with the 

physician.


     If you develop palpitations, chest pain, headache, or tremors, stop the 

medicine and consult your physician.








COUGH-SUPPRESSANT & EXPECTORANT MEDICATION:


     You are to use a cough medication as needed for relief of symptoms.  This 

medicine is a combination of an expectorant (to make the mucous thinner and 

more easily "coughed up") and a cough suppressant (to reduce the frequency of 

coughing).


     The cough-suppressant medicine is related to narcotics.  You may 

experience mild nausea and sleepiness.  Some patients who are very sensitive to 

narcotics may have stomach pain from this medicine. Taking the medicine with 

food reduces these side effects.  Do not drive or work with machinery until you 

know how this medicine affects you.


     The expectorant should have no side effects.  Iodine-containing 

expectorants (such as organidin) should not be taken by persons with active 

thyroid disease unless approved by your doctor.


     Call the doctor if you develop shortness of breath, hives, rash, itching, 

lightheadedness, or severe nausea and vomiting.





USE OF ACETAMINOPHEN (Tylenol):


     Acetaminophen may be taken for pain relief or fever control. It's much 

safer than aspirin, offering a wider range of "safe" dosages.  It is safe 

during pregnancy.  Some brand names are Tylenol, Panadol, Datril, Anacin 3, 

Tempra, and Liquiprin. Acetaminophen can be repeated every four hours.  The 

following are maximum recommended dosages:


>89 pounds or adults          650 mg to 900 mg


Acetaminophen can be repeated every four hours.  Maximum dose not to exceed 

4000 mg a day.





You have been treated with Claritin 10 mg p.o., Sudafed 30 mg p.o., and Mucinex 

600 mg p.o. in the emergency room.  These are all over-the-counter medications 

that you can buy the Sudafed you will have to ask the pharmacist for.  I also 

recommend Flonase which is over-the-counter follow the instructions on the 

bottle.  I also recommend salt and soda solution gargles.





Salt and soda solution


1 quart of water


1 tablespoon of salt


1 teaspoon of baking soda


Mixed 3 ingredients together and boil for 1 minute


Placed in a covered quart jar


Use 1/2 ounce of cold solution to gargle 3 times a day





FOLLOW-UP CARE:


If you have been referred to a physician for follow-up care, call the physician

s office for an appointment as you were instructed or within the next two days.

  If you experience worsening or a significant change in your symptoms, notify 

the physician immediately or return to the Emergency Department at any time for 

re-evaluation.





Forms:  Return to Work

## 2018-06-25 ENCOUNTER — HOSPITAL ENCOUNTER (EMERGENCY)
Dept: HOSPITAL 62 - ER | Age: 28
Discharge: HOME | End: 2018-06-25
Payer: SELF-PAY

## 2018-06-25 VITALS — SYSTOLIC BLOOD PRESSURE: 105 MMHG | DIASTOLIC BLOOD PRESSURE: 71 MMHG

## 2018-06-25 DIAGNOSIS — R21: Primary | ICD-10-CM

## 2018-06-25 DIAGNOSIS — L29.9: ICD-10-CM

## 2018-06-25 DIAGNOSIS — Z91.018: ICD-10-CM

## 2018-06-25 PROCEDURE — 99282 EMERGENCY DEPT VISIT SF MDM: CPT

## 2018-06-25 NOTE — ER DOCUMENT REPORT
ED Skin Rash/Insect Bite/Abscs





- General


Chief Complaint: Itching


Stated Complaint: SKIN ISSUE


Time Seen by Provider: 06/25/18 09:13


Mode of Arrival: Ambulatory


Information source: Patient


Notes: 





The 7-year-old female went to the beach on Saturday.  She states she had some 

itching on Saturday rash broke out on Sunday she is tried Benadryl twice a day 

and hydrocodone zone cream and is not getting any relief from the itching.  She 

states she does not know what she came in contact with but the itching is 

getting unbearable.  Patient is alert and oriented respirations regular and 

unlabored speaking in full sentences.  No shortness of breath no swelling to 

her mouth and throat base.  No angioedema.


TRAVEL OUTSIDE OF THE [place] IN LAST 30 DAYS: No





- HPI


Patient complains to provider of: Skin rash/lesion


Onset: Other - Saturday


Onset/Duration: Gradual, Worse


Quality of pain: No pain


Severity: None


Pain Level: Denies


Skin Character: Rash


Quality of rash: Itchy


Identify cause: No


Exacerbated by: Denies


Relieved by: Denies


Similar symptoms previously: No


Recently seen / treated by doctor: No





- Related Data


Allergies/Adverse Reactions: 


 





No Known Drug Allergies Allergy (Verified 06/25/18 09:00)


 


pineapple [Pineapple] Allergy (Verified 06/25/18 09:00)


 











Past Medical History





- General


Information source: Patient





- Social History


Smoking Status: Never Smoker


Cigarette use (# per day): No


Chew tobacco use (# tins/day): No


Smoking Education Provided: No


Frequency of alcohol use: None


Drug Abuse: None


Occupation: [employer]


Lives with: Parents


Family History: Reviewed & Not Pertinent


Patient has suicidal ideation: No


Patient has homicidal ideation: No





- Past Medical History


Cardiac Medical History: Reports: None


Pulmonary Medical History: Reports: Hx Bronchitis


EENT Medical History: Reports: None


Neurological Medical History: Reports: Hx Migraine


Endocrine Medical History: Reports: None


Renal/ Medical History: Reports: None


Malignancy Medical History: Reports: None


GI Medical History: Reports: None


Musculoskeltal Medical History: Reports None


Skin Medical History: Reports None


Psychiatric Medical History: Reports: None, Hx Depression


Traumatic Medical History: Reports: None


Infectious Medical History: Reports: None


Surgical Hx: Negative


Past Surgical History: Reports: None





- Immunizations


Hx Diphtheria, Pertussis, Tetanus Vaccination: Yes





Review of Systems





- Review of Systems


Constitutional: No symptoms reported


EENT: No symptoms reported


Cardiovascular: No symptoms reported


Respiratory: No symptoms reported


Gastrointestinal: No symptoms reported


Genitourinary: No symptoms reported


Female Genitourinary: No symptoms reported


Musculoskeletal: No symptoms reported


Skin: Rash


Hematologic/Lymphatic: No symptoms reported


Neurological/Psychological: No symptoms reported


-: Yes All other systems reviewed and negative





Physical Exam





- Vital signs


Vitals: 


 











Temp Pulse Resp BP Pulse Ox


 


 97.8 F   79   16   105/71   98 


 


 06/25/18 09:01  06/25/18 09:01  06/25/18 09:01  06/25/18 09:01  06/25/18 09:01











Interpretation: Normal





- General


General appearance: Appears well, Alert





- HEENT


Head: Normocephalic, Atraumatic


Eyes: Normal


Pupils: PERRL





- Respiratory


Respiratory status: No respiratory distress


Chest status: Nontender


Breath sounds: Normal


Chest palpation: Normal





- Cardiovascular


Rhythm: Regular


Heart sounds: Normal auscultation


Murmur: No





- Abdominal


Inspection: Normal


Distension: No distension


Bowel sounds: Normal


Tenderness: Nontender


Organomegaly: No organomegaly





- Back


Back: Normal, Nontender





- Extremities


General upper extremity: Normal inspection, Nontender, Normal color, Normal ROM

, Normal temperature


General lower extremity: Normal inspection, Nontender, Normal color, Normal ROM

, Normal temperature, Normal weight bearing.  No: Catracho's sign





- Neurological


Neuro grossly intact: Yes


Cognition: Normal


Orientation: AAOx4


[place] Coma Scale Eye Opening: Spontaneous


[place] Coma Scale Verbal: Oriented


[place] Coma Scale Motor: Obeys Commands


[place] Coma Scale Total: 15


Speech: Normal


Motor strength normal: LUE, RUE, LLE, RLE


Sensory: Normal





- Psychological


Associated symptoms: Normal affect, Normal mood





- Skin


Skin Temperature: Warm


Skin Moisture: Dry


Skin Color: Normal


Skin irregularity: Erythema, Rash


Location of irregularity: Abdomen, Chest, Extremities


Character of irregularity: Maculopapular





Course





- Re-evaluation


Re-evalutation: 





06/25/18 09:22


Patient was treated with Vistaril Pepcid and prednisone and discharged home 

with prescriptions for the same.  Patient was instructed to follow-up with her 

primary doctor.  Patient was able to verbalize understanding and agreement with 

treatment plan.





- Vital Signs


Vital signs: 


 











Temp Pulse Resp BP Pulse Ox


 


 97.8 F   79   16   105/71   98 


 


 06/25/18 09:01  06/25/18 09:01  06/25/18 09:01  06/25/18 09:01  06/25/18 09:01














Discharge





- Discharge


Clinical Impression: 


 Rash and nonspecific skin eruption





Condition: Stable


Disposition: HOME, SELF-CARE


Instructions:  Family Physicians / Practices, Use of Over-The-Counter Ibuprofen 

([facility])


Additional Instructions: 


ACUTE ALLERGIC REACTION:





     Your symptoms are due to an allergic reaction.  Allergy can cause hives, 

swelling of the hands, feet, and face, hoarseness, and difficulty swallowing or 

breathing.  It may be due to exposure to medication, animal dander, foods, 

infection, or insect bites. Medication is a common cause, even when prior use 

of this same medication caused no problems.


     Acute treatment may include adrenalin and antihistamines. Usually, the 

specific allergic agent can't be identified unless repeated episodes occur.


     Home treatment includes the following: 


(1) Stop any suspicious medications.  This will be discussed with you.  


(2) Oral antihistamines for the next four to five days. Example, 

diphenhydramine (Benadryl) every four hours.


(3) You may also use cimetidine (Tagamet), ranitidine (Zantac), or famotidine (

Pepcid) every four hours if diphenhydramine is not controlling itching and 

hives. 


(4) Avoid aspirin until the hives completely disappear.  


(5) Avoid hot baths or showers until the hives are completely gone.


     Call the doctor if faintness, difficulty swallowing, tightness in the chest

, or wheezing occurs.








STEROID MEDICATION:


     You have been given a medicine of the cortisone/steroid class.  This 

medication is used to control inflammation or allergy.  It is usually only 

given for a short period of time, until the acute process subsides.


     There are usually no side effects from short-term use of cortisone-like 

medications.  Some persons feel an increased sense of well-being and are not 

sleepy at bedtime.  Long-term use of cortisone medications is best avoided, 

unless required for a severe condition.  If your condition does not remit, or 

relapses after the course of corticosteroid medication, you should consult your 

physician.








ACID-SUPPRESSING MEDICATION:


     You have a prescription for medicine which reduces the stomach's secretion 

of acid.  Examples include Zantac, Tagament, and Pepcid.  These drugs are often 

used to allow healing of ulcers or esophagitis.  They may be needed to prevent 

recurrence of ulcers in some patients, or to prevent damage from acid reflux in 

the esophagus.


     Take all medication as prescribed, even after the pain is gone. Regular 

antacids may be added as needed if you have symptoms while taking this medicine.


     These medications sometimes are prescribed for allergic reactions because 

they have anti-histaminic effects and relieve the rash and itching of the 

reaction.


     There are usually no side effects from this medication.  But, in rare 

cases and particularly in the elderly, serious problems can occur. Contact your 

doctor if there is fever, rash, hallucinations, confusion, or unusual bruising.


     Contact your doctor at once if you develop lightheadedness, black or 

bloody stool, or bloody vomitus.








ANTIHISTAMINES:


     An antihistamine has been given and/or prescribed to control your 

symptoms. Antihistamines are used for many reasons, including itching, watering 

eyes, runny nose, allergic swelling, hives, and insect stings.


     Antihistamines may cause drowsiness, especially with the first dose.  Do 

not operate machinery or drive while under the effects of the medication.  

Other common side effects include dry mouth and eyes.  In older persons, 

antihistamines can occasionally cause urinary retention, constipation, and 

trouble focusing the eyes.


     Do not combine the medication with alcohol, or with any other medication 

without talking to your doctor.








FOLLOW-UP CARE:


If you have been referred to a physician for follow-up care, call the physician

s office for an appointment as you were instructed or within the next two days.

  If you experience worsening or a significant change in your symptoms, notify 

the physician immediately or return to the Emergency Department at any time for 

re-evaluation.





Prescriptions: 


Famotidine [Pepcid 20 mg Tablet] 20 mg PO BID #12 tablet


Hydroxyzine Pamoate [Vistaril 25 mg Capsule] 25 mg PO BIDP PRN #10 capsule


 PRN Reason: 


Prednisone [Sterapred Ds] 1 pkg PO ASDIR PRN 12 Days  [id number]


 PRN Reason: 


Forms:  Return to Work

## 2018-07-24 ENCOUNTER — HOSPITAL ENCOUNTER (EMERGENCY)
Dept: HOSPITAL 62 - ER | Age: 28
LOS: 1 days | Discharge: HOME | End: 2018-07-25
Payer: SELF-PAY

## 2018-07-24 DIAGNOSIS — J02.9: ICD-10-CM

## 2018-07-24 DIAGNOSIS — R11.0: ICD-10-CM

## 2018-07-24 DIAGNOSIS — J01.00: Primary | ICD-10-CM

## 2018-07-24 PROCEDURE — 87880 STREP A ASSAY W/OPTIC: CPT

## 2018-07-24 PROCEDURE — 99283 EMERGENCY DEPT VISIT LOW MDM: CPT

## 2018-07-24 PROCEDURE — S0119 ONDANSETRON 4 MG: HCPCS

## 2018-07-24 PROCEDURE — 87070 CULTURE OTHR SPECIMN AEROBIC: CPT

## 2018-07-24 NOTE — ER DOCUMENT REPORT
HPI





- HPI


Pain Level: 4


Notes: 





Patient presents with chief complaint of sore throat, chills and nausea 2 

days.  Patient reports she feels like she may have strep throat.  Patient 

denies any abdominal pain, vomiting or diarrhea.





- CONSTITUTIONAL


Constitutional: REPORTS: Chills.  DENIES: Fever





- EENT


EENT: REPORTS: Sore Throat.  DENIES: Ear Pain, Eye problems





- GASTROINTESTINAL


Gastrointestinal: DENIES: Abdominal Pain, Black / Bloody Stools





- REPRODUCTIVE


Reproductive: DENIES: Pregnant:





Past Medical History





- General


Information source: Patient





- Social History


Smoking Status: Never Smoker


Chew tobacco use (# tins/day): No


Frequency of alcohol use: None


Drug Abuse: None


Family History: Reviewed & Not Pertinent


Patient has suicidal ideation: No


Patient has homicidal ideation: No


Pulmonary Medical History: Reports: Hx Bronchitis


Neurological Medical History: Reports: Hx Migraine


Renal/ Medical History: Denies: Hx Peritoneal Dialysis


Psychiatric Medical History: Reports: Hx Depression





- Immunizations


Hx Diphtheria, Pertussis, Tetanus Vaccination: Yes





Vertical Provider Document





- CONSTITUTIONAL


Notes: 





PHYSICAL EXAMINATION:





GENERAL: Well-appearing, well-nourished and in no acute distress.





HEAD: Atraumatic, normocephalic.





EYES: Pupils equal round and reactive to light, extraocular movements intact, 

conjunctiva are normal.





ENT: Nares patent, oropharynx mildly erythematous without exudates.  Moist 

mucous membranes.





NECK: Normal range of motion, supple without lymphadenopathy





LUNGS: Breath sounds clear to auscultation bilaterally and equal.  No wheezes 

rales or rhonchi.





HEART: Regular rate and rhythm without murmurs





ABDOMEN: Soft, nontender, nondistended abdomen.  No guarding, no rebound.  No 

masses appreciated.





Female : deferred





Musculoskeletal: Normal range of motion, no pitting or edema.  No cyanosis.





NEUROLOGICAL: Cranial nerves grossly intact.  Normal speech, normal gait.  

Normal sensory, motor exams





PSYCH: Normal mood, normal affect.





SKIN: Warm, Dry, normal turgor, no rashes or lesions noted.





- INFECTION CONTROL


TRAVEL OUTSIDE OF THE [place] IN LAST 30 DAYS: No





Course





- Re-evaluation


Re-evalutation: 





07/24/18 23:06


Will send rapid strep down for evaluation.  Patient will be medicated for pain 

and nausea.  Rapid strep is negative.  Pain with patient over maxillary 

sinuses.  Will treat patient for likely sinus infection.  Patient discharged 

home in stable condition.








- Vital Signs


Vital signs: 


 











Temp Pulse Resp BP Pulse Ox


 


 98.1 F   83   12   125/91 H  99 


 


 07/24/18 21:56  07/24/18 21:56  07/24/18 21:56  07/24/18 21:56  07/24/18 21:56














Discharge





- Discharge


Clinical Impression: 


Sinus infection


Qualifiers:


 Sinusitis location: maxillary Chronicity: acute Recurrence: non-recurrent 

Qualified Code(s): J01.00 - Acute maxillary sinusitis, unspecified





Condition: Good


Disposition: HOME, SELF-CARE


Additional Instructions: 


Sinusitis





     You have sinusitis, an infection of the sinus cavities of the face.  The 

sinuses are air-filled chambers which open into the inside of the nose.  

Bacteria and pus fill a sinus, causing pain, drainage, and fever.


     Sinusitis is treated with antibiotics.  Often, expectorants (to thin the 

sinus mucous) or decongestants (to reduce swelling) are prescribed as well.  

Healing requires seven to 10 days.


     Avoid chemical fumes, pollens, dusts, and smoke (especially cigarette smoke

).  Keep the air humidified in your bedroom and work area and take plenty of 

liquids by mouth.


     This condition can be serious if the infection spreads.  If your symptoms 

worsen, or if you develop severe headache, high fever, stiff neck, or a rash, 

you must call the doctor or return for re-evaluation.





Please take all medications as prescribed.  Centers the antibiotics even if you 

are feeling better.  Please follow-up with your primary care provider next week 

for a follow-up.





Prescriptions: 


Amox Tr/Potassium Clavulanate [Augmentin 875-125 Tablet] 1 tab PO BID 10 Days #

20 tablet


Fluticasone Propionate [Flonase Nasal Spray 50 Mcg/Spray 16 gm] 2 sprays NASL 

Q12 #1 inhaler


Ondansetron [Zofran Odt 4 mg Tablet] 1 - 2 tab PO Q4H PRN #15 tab.rapdis


 PRN Reason: For Nausea/Vomiting


Forms:  Return to Work

## 2018-07-25 VITALS — SYSTOLIC BLOOD PRESSURE: 98 MMHG | DIASTOLIC BLOOD PRESSURE: 54 MMHG

## 2018-07-29 ENCOUNTER — HOSPITAL ENCOUNTER (EMERGENCY)
Dept: HOSPITAL 62 - ER | Age: 28
Discharge: HOME | End: 2018-07-29
Payer: SELF-PAY

## 2018-07-29 VITALS — DIASTOLIC BLOOD PRESSURE: 59 MMHG | SYSTOLIC BLOOD PRESSURE: 116 MMHG

## 2018-07-29 DIAGNOSIS — Z91.018: ICD-10-CM

## 2018-07-29 DIAGNOSIS — M54.12: Primary | ICD-10-CM

## 2018-07-29 PROCEDURE — 99283 EMERGENCY DEPT VISIT LOW MDM: CPT

## 2018-07-29 NOTE — ER DOCUMENT REPORT
ED Extremity Problem, Upper





- General


Chief Complaint: Arm Pain


Stated Complaint: ARM NUMBNESS


Time Seen by Provider: 07/29/18 21:11


TRAVEL OUTSIDE OF THE [place] IN LAST 30 DAYS: No





- HPI


Notes: 





27-year-old female presents with left upper extremity pain and numbness.  She 

describes onset over the last day or 2 gradually of pain that radiates from her 

left lateral neck down her left posterior lateral arm into her hand.  Now she 

has some tingling and numbness in his lateral area.  Denies any fever, no 

trauma.  No other associated symptoms.  She does quite a bit of heavy lifting 

and pushing and moving things at work.  Gradual onset, nonradiating except as 

described.  No other modifying factors, no other associated symptoms, no other 

provocative or palliative factors.





- Related Data


Allergies/Adverse Reactions: 


 





No Known Drug Allergies Allergy (Verified 06/25/18 09:00)


 


pineapple [Pineapple] Allergy (Verified 06/25/18 09:00)


 











Past Medical History





- Social History


Smoking Status: Never Smoker


Frequency of alcohol use: None


Drug Abuse: None


Family History: Reviewed & Not Pertinent


Patient has suicidal ideation: No


Patient has homicidal ideation: No





- Medical History


Medical History: Negative


Pulmonary Medical History: Reports: Hx Bronchitis


Neurological Medical History: Reports: Hx Migraine


Renal/ Medical History: Denies: Hx Peritoneal Dialysis


Psychiatric Medical History: Reports: Hx Depression





- Immunizations


Hx Diphtheria, Pertussis, Tetanus Vaccination: Yes





Review of Systems





- Review of Systems


Notes: 





Review of systems as in history of present illness, otherwise no significant 

headache, chest pain, abdominal pain.





Physical Exam





- Vital signs


Vitals: 





 











Temp Pulse Resp BP Pulse Ox


 


 97.8 F   67   18   116/59 L  100 


 


 07/29/18 20:03  07/29/18 20:03  07/29/18 20:03  07/29/18 20:03  07/29/18 20:03














- Notes


Notes: 





General:  Well devloped, no acute distress.


HEENT:  Normocephalic, atraumatic. Pupils equal round reactive to light. Mucosa 

moist. No JVD.


Chest: No trauma, normal excursion.


Respiratory: Good air exchange, normal excursion.


Cardiac: Regular rhythm


Abdomen: Soft, benign. Nondistended.


Back: No asymmetry or gross abnormality.


Motor: Grossly normal power and tone.


Neurologic: Alert, nonfocal.


Vascular: Well perfused


Skin: No petechiae or purpura


Left upper extremity evaluation shows normal pulses.  Slightly diminished 

sensation laterally along the upper arm and the ulnar aspect of the lower arm.  

 strength and intrinsic hand muscle strength is symmetric.  Flexion of the 

elbow is limited by pain.  Abduction of the shoulder is limited by pain.





Course





- Re-evaluation


Re-evalutation: 





07/29/18 21:31


Well-appearing female likely cervical radiculopathy.  No high-risk features.  

Will treat conservatively with NSAIDs, Flexeril, steroids, outpatient follow-

up.  Likely benefit from outpatient MR imaging.





- Vital Signs


Vital signs: 





 











Temp Pulse Resp BP Pulse Ox


 


 97.8 F   67   18   116/59 L  100 


 


 07/29/18 20:03  07/29/18 20:03  07/29/18 20:03  07/29/18 20:03  07/29/18 20:03














Discharge





- Discharge


Clinical Impression: 


 Cervical radiculopathy





Condition: Good


Disposition: HOME, SELF-CARE


Prescriptions: 


Cyclobenzaprine HCl [Flexeril 10 mg Tablet] 10 mg PO TIDP PRN #15 tab


 PRN Reason: 


Methylprednisolone [Medrol Dosepack (4 mg/Tab) 21 Tab/Dosepak] 21 tab PO DAILY 

5 Days  dspk


Naproxen [Naprosyn] 500 mg PO Q12 PRN #10 tablet


 PRN Reason:

## 2018-08-14 ENCOUNTER — HOSPITAL ENCOUNTER (EMERGENCY)
Dept: HOSPITAL 62 - ER | Age: 28
Discharge: HOME | End: 2018-08-14
Payer: MEDICAID

## 2018-08-14 VITALS — SYSTOLIC BLOOD PRESSURE: 118 MMHG | DIASTOLIC BLOOD PRESSURE: 63 MMHG

## 2018-08-14 DIAGNOSIS — O26.891: Primary | ICD-10-CM

## 2018-08-14 DIAGNOSIS — O99.511: ICD-10-CM

## 2018-08-14 DIAGNOSIS — Z3A.01: ICD-10-CM

## 2018-08-14 DIAGNOSIS — J34.89: ICD-10-CM

## 2018-08-14 DIAGNOSIS — R06.02: ICD-10-CM

## 2018-08-14 DIAGNOSIS — R09.81: ICD-10-CM

## 2018-08-14 DIAGNOSIS — R04.0: ICD-10-CM

## 2018-08-14 PROCEDURE — 99285 EMERGENCY DEPT VISIT HI MDM: CPT

## 2018-08-14 PROCEDURE — 71046 X-RAY EXAM CHEST 2 VIEWS: CPT

## 2018-08-14 NOTE — RADIOLOGY REPORT (SQ)
EXAM DESCRIPTION:  CHEST 2 VIEWS



COMPLETED DATE/TIME:  8/14/2018 7:10 pm



REASON FOR STUDY:  sob,shield abd



COMPARISON:  3/21/2018



EXAM PARAMETERS:  NUMBER OF VIEWS: two views

TECHNIQUE: Digital Frontal and Lateral radiographic views of the chest acquired.

RADIATION DOSE: NA

LIMITATIONS: none



FINDINGS:  LUNGS AND PLEURA: No opacities, masses or pneumothorax. No pleural effusion.

MEDIASTINUM AND HILAR STRUCTURES: No masses or contour abnormalities.

HEART AND VASCULAR STRUCTURES: Heart normal size.  No evidence for failure.

BONES: No acute findings.

HARDWARE: None in the chest.

OTHER: No other significant finding.



IMPRESSION:  NO ACUTE RADIOGRAPHIC FINDING IN THE CHEST.



TECHNICAL DOCUMENTATION:  JOB ID:  [id number]

 2011 [employer]- All Rights Reserved



Reading location - IP/workstation name: [email or web address]

## 2018-08-14 NOTE — ER DOCUMENT REPORT
HPI





- HPI


Patient complains to provider of: Shortness of breath


Onset: Yesterday


Onset/Duration: Gradual


Pain Level: Denies


Context: 





Patient states she developed shortness of breath yesterday that persisted 

today.  Patient denies any cough, cold symptoms nausea or vomiting.  Patient 

states she is 7 weeks pregnant .  Patient denies any vaginal bleeding, 

urinary symptoms or abdominal pain.  Patient denies any recent travel, bedrest 

remobilization.  Patient denies any history of DVT or PE in the past.  Patient 

also complains of sinus congestion with a nosebleed yesterday.


Associated Symptoms: Shortness of breath.  denies: Nonproductive cough, 

Productive cough


Exacerbated by: Denies


Relieved by: Denies


Similar symptoms previously: No


Recently seen / treated by doctor: No





- ROS


ROS below otherwise negative: Yes


Systems Reviewed and Negative: Yes All other systems reviewed and negative





- CONSTITUTIONAL


Constitutional: DENIES: Fever





- EENT


EENT: REPORTS: Congestion.  DENIES: Sore Throat





- CARDIOVASCULAR


Cardiovascular: DENIES: Chest pain





- RESPIRATORY


Respiratory: REPORTS: Trouble Breathing.  DENIES: Coughing





- GASTROINTESTINAL


Gastrointestinal: DENIES: Abdominal Pain, Nausea, Patient vomiting





- REPRODUCTIVE


Reproductive: DENIES: Pregnant:





- MUSCULOSKELETAL


Musculoskeletal: DENIES: Extremity pain, Back Pain, Neck Pain, Swelling





- DERM


Skin Color: Normal


Skin Problems: None





Past Medical History





- General


Information source: Patient





- Social History


Smoking Status: Never Smoker


Chew tobacco use (# tins/day): No


Frequency of alcohol use: None


Drug Abuse: None


Occupation: Foodservice


Lives with: Family


Family History: Reviewed & Not Pertinent


Patient has suicidal ideation: No


Patient has homicidal ideation: No


Pulmonary Medical History: Reports: Hx Bronchitis


Neurological Medical History: Reports: Hx Migraine


Renal/ Medical History: Denies: Hx Peritoneal Dialysis


Psychiatric Medical History: Reports: Hx Depression


Surgical Hx: Negative





- Immunizations


Hx Diphtheria, Pertussis, Tetanus Vaccination: Yes





Vertical Provider Document





- CONSTITUTIONAL


Agree With Documented VS: Yes


Exam Limitations: No Limitations


General Appearance: WD/WN, No Apparent Distress





- INFECTION CONTROL


TRAVEL OUTSIDE OF THE [place] IN LAST 30 DAYS: No





- HEENT


HEENT: Atraumatic, Normocephalic.  negative: Pharyngeal Exudate, Pharyngeal 

Tenderness, Pharyngeal Erythema, Tympanic Membrane Red, Tympanic Membrane 

Bulging


Notes: 





Clear rhinorrhea, swollen nasal mucosa





- NECK


Neck: Normal Inspection, Supple





- RESPIRATORY


Respiratory: Breath Sounds Normal, No Respiratory Distress, Chest Non-Tender.  

negative: Rales, Rhonchi, Wheezing





- CARDIOVASCULAR


Cardiovascular: Regular Rate, Regular Rhythm, No Murmur.  negative: Tachycardia





- GI/ABDOMEN


Gastrointestinal: Abdomen Soft, Abdomen Non-Tender





- BACK


Back: Normal Inspection





- MUSCULOSKELETAL/EXTREMETIES


Musculoskeletal/Extremeties: MAEW, FROM, Non-Tender, No Edema





- NEURO


Level of Consciousness: Awake, Alert, Appropriate


Motor/Sensory: No Motor Deficit





- DERM


Integumentary: Warm, Dry, No Rash





Course





- Re-evaluation


Re-evalutation: 





18 18:42


Consult with Dr. Panda regarding patient presentation and diagnostic 

evaluation.  Agrees with plan for chest x-ray, no additional testing advised at 

this time.


18 


Patient's respirations even and unlabored.  Patient without any objective 

dyspnea symptoms.  Stable vital signs.  Patient PERC negative





- Vital Signs


Vital signs: 


 











Temp Pulse Resp BP Pulse Ox


 


 98.2 F   71   16   126/59 H  100 


 


 18 18:16  18 18:16  18 18:16  18 18:16  18 18:16














- Diagnostic Test


Radiology reviewed: Reports reviewed





Discharge





- Discharge


Clinical Impression: 


 Shortness of breath





Condition: Stable


Disposition: HOME, SELF-CARE


Instructions:  Dyspnea, Nonspecific ([facility])


Additional Instructions: 


Return immediately for any new or worsening symptoms





Followup with your primary care provider, call tomorrow to make a followup 

appointment








Forms:  Return to Work


Referrals: 


ROQUE STOVALL MD [Primary Care Provider] - Follow up tomorrow


[facility] [NO LOCAL MD] - Follow up tomorrow

## 2018-08-22 ENCOUNTER — HOSPITAL ENCOUNTER (OUTPATIENT)
Dept: HOSPITAL 62 - RAD | Age: 28
End: 2018-08-22
Attending: NURSE PRACTITIONER
Payer: MEDICAID

## 2018-08-22 DIAGNOSIS — Z34.01: Primary | ICD-10-CM

## 2018-08-22 PROCEDURE — 76801 OB US < 14 WKS SINGLE FETUS: CPT

## 2018-08-22 NOTE — RADIOLOGY REPORT (SQ)
EXAM DESCRIPTION:  U/S BE8HCYM TRNABD 1GES W/ODOP



COMPLETED DATE/TIME:  8/22/2018 3:30 pm



REASON FOR STUDY:  Z34.01 ENCNTR FOR SUPRVSN OF NORMAL FIRST PREG, FIRST TRIMESTER Z34.01  ENCNTR FOR
 SUPRVSN OF NORMAL FIRST PREG, FIRST TRIMES



COMPARISON:  None.



TECHNIQUE:  Transvaginal static and realtime grayscale images acquired of the pelvis. Additional grisel
cted spectral and color Doppler images recorded. All images stored on PACs.

bHCG: Not available.

CLINICAL DATES:  NAV.



LIMITATIONS:  None.



FINDINGS:  FETUS: Living intrauterine pregnancy.

ULTRASOUND EGA: 9 weeks 5 days

ULTRASOUND NAV: 3/22/2019

CRL:  2.9 cm

FHR: 169  beats per minute.

SUBCHORIONIC BLEED: No.

SIZE OF BLEED: Not applicable.

UTERUS: 2.4 x 4 cm fibroid.

CERVICAL LENGTH: 3.1 cm.   Closed.

RIGHT ADNEXA: Normal ovary with normal vascular flow.

No adnexal free fluid.

Corpus Luteum measuring 4 cm.

LEFT ADNEXA: Ovary not identified.

No adnexal free fluid.

No adnexal masses.

FREE FLUID: None.

OTHER: No other significant finding.



IMPRESSION:  LIVING INTRAUTERINE PREGNANCY.

EGA 9 weeks 5 days.

Trimester of pregnancy:  First - 0 to 13 weeks.



TECHNICAL DOCUMENTATION:  JOB ID:  [id number]

 2011 [employer] All Rights Reserved                            rev-5/18



Reading location - IP/workstation name: [facility]

## 2018-09-10 ENCOUNTER — HOSPITAL ENCOUNTER (EMERGENCY)
Dept: HOSPITAL 62 - ER | Age: 28
Discharge: HOME | End: 2018-09-10
Payer: COMMERCIAL

## 2018-09-10 VITALS — SYSTOLIC BLOOD PRESSURE: 116 MMHG | DIASTOLIC BLOOD PRESSURE: 63 MMHG

## 2018-09-10 DIAGNOSIS — R10.9: ICD-10-CM

## 2018-09-10 DIAGNOSIS — O23.11: Primary | ICD-10-CM

## 2018-09-10 DIAGNOSIS — Z3A.12: ICD-10-CM

## 2018-09-10 DIAGNOSIS — N30.01: ICD-10-CM

## 2018-09-10 LAB
ADD MANUAL DIFF: NO
APPEARANCE UR: (no result)
APTT PPP: YELLOW S
BASOPHILS # BLD AUTO: 0 10^3/UL (ref 0–0.2)
BASOPHILS NFR BLD AUTO: 0.1 % (ref 0–2)
BILIRUB UR QL STRIP: NEGATIVE
EOSINOPHIL # BLD AUTO: 0.1 10^3/UL (ref 0–0.6)
EOSINOPHIL NFR BLD AUTO: 1.5 % (ref 0–6)
ERYTHROCYTE [DISTWIDTH] IN BLOOD BY AUTOMATED COUNT: 13.9 % (ref 11.5–14)
GLUCOSE UR STRIP-MCNC: NEGATIVE MG/DL
HCT VFR BLD CALC: 37.5 % (ref 36–47)
HGB BLD-MCNC: 12.4 G/DL (ref 12–15.5)
KETONES UR STRIP-MCNC: NEGATIVE MG/DL
LYMPHOCYTES # BLD AUTO: 1.2 10^3/UL (ref 0.5–4.7)
LYMPHOCYTES NFR BLD AUTO: 17.6 % (ref 13–45)
MCH RBC QN AUTO: 27.8 PG (ref 27–33.4)
MCHC RBC AUTO-ENTMCNC: 33.2 G/DL (ref 32–36)
MCV RBC AUTO: 84 FL (ref 80–97)
MONOCYTES # BLD AUTO: 0.8 10^3/UL (ref 0.1–1.4)
MONOCYTES NFR BLD AUTO: 11.8 % (ref 3–13)
NEUTROPHILS # BLD AUTO: 4.5 10^3/UL (ref 1.7–8.2)
NEUTS SEG NFR BLD AUTO: 69 % (ref 42–78)
NITRITE UR QL STRIP: NEGATIVE
PH UR STRIP: 6 [PH] (ref 5–9)
PLATELET # BLD: 193 10^3/UL (ref 150–450)
PROT UR STRIP-MCNC: 30 MG/DL
RBC # BLD AUTO: 4.48 10^6/UL (ref 3.72–5.28)
SP GR UR STRIP: 1.02
TOTAL CELLS COUNTED % (AUTO): 100 %
UROBILINOGEN UR-MCNC: NEGATIVE MG/DL (ref ?–2)
WBC # BLD AUTO: 6.5 10^3/UL (ref 4–10.5)

## 2018-09-10 PROCEDURE — 36415 COLL VENOUS BLD VENIPUNCTURE: CPT

## 2018-09-10 PROCEDURE — 84702 CHORIONIC GONADOTROPIN TEST: CPT

## 2018-09-10 PROCEDURE — 81001 URINALYSIS AUTO W/SCOPE: CPT

## 2018-09-10 PROCEDURE — 85025 COMPLETE CBC W/AUTO DIFF WBC: CPT

## 2018-09-10 PROCEDURE — 86901 BLOOD TYPING SEROLOGIC RH(D): CPT

## 2018-09-10 PROCEDURE — 86900 BLOOD TYPING SEROLOGIC ABO: CPT

## 2018-09-10 PROCEDURE — 99283 EMERGENCY DEPT VISIT LOW MDM: CPT

## 2018-09-10 PROCEDURE — 87086 URINE CULTURE/COLONY COUNT: CPT

## 2018-09-10 NOTE — ER DOCUMENT REPORT
ED Medical Screen (RME)





- General


Chief Complaint: Urinary Problem


Stated Complaint: URINARY PROBLEMS


Time Seen by Provider: 09/10/18 14:44


Mode of Arrival: Ambulatory


Information source: Patient


TRAVEL OUTSIDE OF THE [place] IN LAST 30 DAYS: No





- HPI


Notes: 





09/10/18 14:58


28-year-old black female  currently 12 weeks 2 days pregnant presents with 

report of minimal vaginal spotting slight crampy pelvic pain.  The patient was 

seen on 18 with a intrauterine pregnancy and a 4 cm right corpus luteum 

cyst and no other abnormality.  The patient was seen 2 days ago with rectal 

pain and constipation and received an enema and had a questionable UTI, 

although urine culture was negative.  She was placed upon Keflex.  She has very 

minimal suprapubic discomfort on exam which she describes as crampiness.  No 

CVA tenderness.  Plan Rh, pelvic exam, CBC UA and fetal heart tones.  Please 

see partner's note for continuation of care.





- Related Data


Allergies/Adverse Reactions: 


 





No Known Drug Allergies Allergy (Verified 09/10/18 12:57)


 


pineapple [Pineapple] Allergy (Verified 09/10/18 12:57)


 











Past Medical History





- Social History


Chew tobacco use (# tins/day): No


Frequency of alcohol use: None


Drug Abuse: None


Family history: None


Pulmonary Medical History: Reports: Hx Bronchitis


Neurological Medical History: Reports: Hx Migraine


Renal/ Medical History: Denies: Hx Peritoneal Dialysis


Psychiatric Medical History: Reports: Hx Depression





- Immunizations


Hx Diphtheria, Pertussis, Tetanus Vaccination: Yes





Physical Exam





- Vital signs


Vitals: 





 











Temp Pulse Resp BP Pulse Ox


 


 98.0 F   86   18   126/61 H  99 


 


 09/10/18 13:16  09/10/18 13:16  09/10/18 13:16  09/10/18 13:16  09/10/18 13:16














Course





- Vital Signs


Vital signs: 





 











Temp Pulse Resp BP Pulse Ox


 


 98.0 F   86   18   126/61 H  99 


 


 09/10/18 13:16  09/10/18 13:16  09/10/18 13:16  09/10/18 13:16  09/10/18 13:16

## 2018-09-10 NOTE — ER DOCUMENT REPORT
ED General





- General


Chief Complaint: Urinary Problem


Stated Complaint: URINARY PROBLEMS


Time Seen by Provider: 09/10/18 14:44


Mode of Arrival: Ambulatory


TRAVEL OUTSIDE OF THE [place] IN LAST 30 DAYS: No





- HPI


Patient complains to provider of: Hematuria


Notes: 





Patient is currently pregnant states recent was here diagnosed a UTI continues 

to have always to be blood in her urine.  Patient states also having some mild 

abdominal cramps.  Patient is yet to follow-up with OB/GYN.  Patient is a .  Patient resting healthy upon my evaluation.  Denies any fevers chills 

nausea vomiting diarrhea denies any trauma.





- Related Data


Allergies/Adverse Reactions: 


 





No Known Drug Allergies Allergy (Verified 09/10/18 12:57)


 


pineapple [Pineapple] Allergy (Verified 09/10/18 12:57)


 











Past Medical History





- General


Information source: Patient





- Social History


Smoking Status: Never Smoker


Chew tobacco use (# tins/day): No


Frequency of alcohol use: None


Drug Abuse: None


Family History: Reviewed & Not Pertinent


Patient has suicidal ideation: No


Patient has homicidal ideation: No


Pulmonary Medical History: Reports: Hx Bronchitis


Neurological Medical History: Reports: Hx Migraine


Renal/ Medical History: Denies: Hx Peritoneal Dialysis


Psychiatric Medical History: Reports: Hx Depression





- Immunizations


Hx Diphtheria, Pertussis, Tetanus Vaccination: Yes





Review of Systems





- Review of Systems


Constitutional: No symptoms reported


EENT: No symptoms reported


Cardiovascular: No symptoms reported


Respiratory: No symptoms reported


Gastrointestinal: No symptoms reported


Genitourinary: Hematuria


Female Genitourinary: No symptoms reported


Musculoskeletal: No symptoms reported


Skin: No symptoms reported


Hematologic/Lymphatic: No symptoms reported


Neurological/Psychological: No symptoms reported


-: Yes All other systems reviewed and negative





Physical Exam





- Vital signs


Vitals: 


 











Temp Pulse Resp BP Pulse Ox


 


 98.0 F   86   18   126/61 H  99 


 


 09/10/18 13:16  09/10/18 13:16  09/10/18 13:16  09/10/18 13:16  09/10/18 13:16











Interpretation: Normal





- General


General appearance: Appears well, Alert





- HEENT


Head: Normocephalic, Atraumatic


Eyes: Normal


Pupils: PERRL





- Respiratory


Respiratory status: No respiratory distress


Chest status: Nontender


Breath sounds: Normal


Chest palpation: Normal





- Cardiovascular


Rhythm: Regular


Heart sounds: Normal auscultation


Murmur: No





- Abdominal


Inspection: Normal


Distension: No distension


Bowel sounds: Normal


Tenderness: Nontender


Organomegaly: No organomegaly





- Back


Back: Normal, Nontender





- Extremities


General upper extremity: Normal inspection, Nontender, Normal color, Normal ROM

, Normal temperature


General lower extremity: Normal inspection, Nontender, Normal color, Normal ROM

, Normal temperature, Normal weight bearing.  No: Catracho's sign





- Neurological


Neuro grossly intact: Yes


Cognition: Normal


Orientation: AAOx4


Jovon Coma Scale Eye Opening: Spontaneous


Jovon Coma Scale Verbal: Oriented


Kingfisher Coma Scale Motor: Obeys Commands


Kingfisher Coma Scale Total: 15


Speech: Normal


Motor strength normal: LUE, RUE, LLE, RLE


Sensory: Normal





- Psychological


Associated symptoms: Normal affect, Normal mood





- Skin


Skin Temperature: Warm


Skin Moisture: Dry


Skin Color: Normal





Course





- Re-evaluation


Re-evalutation: 





09/10/18 21:31


Bedside ultrasound showed an IUP with approximately a heart rate of 162.  

Patient urinalysis does show signs of possible infection urine culture was 

sent.  Patient's continue with the Keflex.  Patient is to follow-up with OB/GYN.





- Vital Signs


Vital signs: 


 











Temp Pulse Resp BP Pulse Ox


 


 98.0 F   86   12   116/63   100 


 


 09/10/18 17:40  09/10/18 17:40  09/10/18 17:40  09/10/18 17:40  09/10/18 17:40














- Laboratory


Result Diagrams: 


 09/10/18 15:17





Laboratory results interpreted by me: 


 











  09/10/18 09/10/18





  13:15 15:17


 


Beta HCG, Quant   17248.00 H


 


Urine Protein  30 H 


 


Urine Blood  LARGE H 


 


Ur Leukocyte Esterase  TRACE H 


 


Urine Ascorbic Acid  40 H 














Discharge





- Discharge


Clinical Impression: 


Urinary tract infection


Qualifiers:


 Urinary tract infection type: acute cystitis Hematuria presence: with 

hematuria Qualified Code(s): N30.01 - Acute cystitis with hematuria





Pregnancy


Qualifiers:


 Weeks of gestation: 12 weeks Qualified Code(s): Z3A.12 - 12 weeks gestation of 

pregnancy





Condition: Good


Disposition: HOME, SELF-CARE


Instructions:  Bleeding During Early Pregnancy ([facility]), Urinary Tract Infection (

[facility])


Additional Instructions: 


Bedside ultrasound showed fetal heart rate to be at 162-158.  I do not see any 

critical pathology at this time.  Would highly recommend she follow-up with 

your OB/GYN.  Avoid placing anything inside the vagina no sex no toys no 

tampons return to ER symptoms worsen follow-up with your primary care physician.





Your blood type is O+





Continue the antibiotics as patient prescribed for your urinary tract infection.

## 2019-01-13 ENCOUNTER — HOSPITAL ENCOUNTER (EMERGENCY)
Dept: HOSPITAL 62 - ER | Age: 29
Discharge: HOME | End: 2019-01-13
Payer: MEDICAID

## 2019-01-13 VITALS — SYSTOLIC BLOOD PRESSURE: 134 MMHG | DIASTOLIC BLOOD PRESSURE: 74 MMHG

## 2019-01-13 DIAGNOSIS — Z3A.30: ICD-10-CM

## 2019-01-13 DIAGNOSIS — S29.012A: ICD-10-CM

## 2019-01-13 DIAGNOSIS — O99.89: ICD-10-CM

## 2019-01-13 DIAGNOSIS — M25.512: ICD-10-CM

## 2019-01-13 DIAGNOSIS — O9A.213: Primary | ICD-10-CM

## 2019-01-13 DIAGNOSIS — R20.2: ICD-10-CM
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
## 2019-01-13 DIAGNOSIS — O26.893: ICD-10-CM

## 2019-01-13 DIAGNOSIS — R20.0: ICD-10-CM

## 2019-01-13 PROCEDURE — 99283 EMERGENCY DEPT VISIT LOW MDM: CPT

## 2019-01-13 NOTE — ER DOCUMENT REPORT
HPI





- HPI


Time Seen by Provider: 01/13/19 10:24


Pain Level: 3


Notes: 





Patient is a 28-year-old female who presents to the emergency department with 

complaints of left posterior shoulder pain.  She states that she woke up with 

this about 3 days ago.  She reports occasional numbness and tingling running 

from her trapezius muscle down into her arm.  Patient is approximately 30 weeks 

pregnant.  Patient denies any shortness of breath or chest pain.








- REPRODUCTIVE


Reproductive: REPORTS: Pregnant:





- MUSCULOSKELETAL


Musculoskeletal: REPORTS: Extremity pain





Past Medical History





- General


Information source: Patient





- Social History


Smoking Status: Never Smoker


Chew tobacco use (# tins/day): No


Frequency of alcohol use: None


Drug Abuse: None


Family History: Reviewed & Not Pertinent


Patient has suicidal ideation: No


Patient has homicidal ideation: No


Pulmonary Medical History: Reports: Hx Bronchitis


Neurological Medical History: Reports: Hx Migraine


Renal/ Medical History: Denies: Hx Peritoneal Dialysis


Psychiatric Medical History: Reports: Hx Depression





- Immunizations


Hx Diphtheria, Pertussis, Tetanus Vaccination: Yes





Vertical Provider Document





- CONSTITUTIONAL


Notes: 





PHYSICAL EXAMINATION:





GENERAL: Well-appearing, well-nourished and in no acute distress.





HEAD: Atraumatic, normocephalic.





EYES: Pupils equal round extraocular movements intact,  conjunctiva are normal.





ENT: Nares patent





NECK: Normal range of motion





LUNGS: No respiratory distress





Musculoskeletal: Normal range of motion, tenderness to palpation along left 

trapezius muscle, normal motor and sensation distal to area of concern, cap 

refill less than 3 seconds, normal pulses.  No vertebral tenderness or step-off 

noted.





NEUROLOGICAL:  Normal speech, normal gait. 





PSYCH: Normal mood, normal affect.





SKIN: Warm, Dry, normal turgor, no rashes or lesions noted.





- INFECTION CONTROL


TRAVEL OUTSIDE OF THE [place] IN LAST 30 DAYS: No





Course





- Re-evaluation


Re-evalutation: 





X-rays not indicated at this time as patient reports no injury to the area.  

Likely musculoskeletal strain of the trapezius muscle.  Patient is 30 weeks 

pregnant which limits her treatment options.  Patient will be given Tylenol 975 

mg and Lidoderm patch and discharged home, she has OB follow-up on Tuesday.








- Vital Signs


Vital signs: 


                                        











Temp Pulse Resp BP Pulse Ox


 


 97.9 F   105 H  16   134/74 H  98 


 


 01/13/19 10:24  01/13/19 10:24  01/13/19 10:24  01/13/19 10:24  01/13/19 10:24














Discharge





- Discharge


Clinical Impression: 


Trapezius muscle strain


Qualifiers:


 Encounter type: initial encounter Laterality: left Qualified Code(s): S46.812A 

- Strain of other muscles, fascia and tendons at shoulder and upper arm level, 

left arm, initial encounter





Condition: Stable


Disposition: HOME, SELF-CARE


Additional Instructions: 


Muscle Strain





     You have strained a muscle -- torn the fibers within the muscle. This often

occurs with strenuous exertion, or during an injury that suddenly stretches the 

muscle.  The seriousness of a strain varies. Some strains heal within days, 

others cause problems for months.


     X-rays cannot show a muscle strain.  X-rays are taken only if symptoms 

suggest that a fracture could be present.


     The usual treatment of a muscle strain is rest and ice packs. Sometimes, a 

sling, splint, or crutches may be necessary to rest the muscle.  The muscle can 

be used again once pain subsides.  Severe strains require a special exercise and

stretching program to prevent permanent stiffness and disability.  Your doctor 

will advise you if this will be necessary.


     Call the doctor immediately if pain or swelling becomes severe, or if 

numbness or discoloration develop.





****Continue taking Tylenol 975 mg every 4 hours, do not exceed 4000 mg in 24 

hours.  Use the lidocaine patches as directed.  Considering that you are 

pregnant we are unable to give you muscle relaxers or ibuprofen.  Please utilize

heat packs as well as stretching or massage.  Keep your appointment that you 

have scheduled with OB for Tuesday, mentioned this to them to see if they have 

any other recommendations for you.****


Prescriptions: 


Lidocaine [Lidoderm 5% (700 mg) Transdermal Patch] 1 patch TP DAILY #30 

adh..patch


Referrals: 


ROQUE STOVALL MD [Primary Care Provider] - Follow up as needed

## 2019-03-11 ENCOUNTER — HOSPITAL ENCOUNTER (INPATIENT)
Dept: HOSPITAL 62 - LC | Age: 29
LOS: 2 days | Discharge: HOME | End: 2019-03-13
Attending: OBSTETRICS & GYNECOLOGY | Admitting: OBSTETRICS & GYNECOLOGY
Payer: MEDICAID

## 2019-03-11 DIAGNOSIS — D69.6: ICD-10-CM

## 2019-03-11 DIAGNOSIS — Z3A.38: ICD-10-CM

## 2019-03-11 DIAGNOSIS — D62: ICD-10-CM

## 2019-03-11 LAB
ADD MANUAL DIFF: NO
APPEARANCE UR: (no result)
APTT PPP: YELLOW S
BARBITURATES UR QL SCN: NEGATIVE
BASOPHILS # BLD AUTO: 0 10^3/UL (ref 0–0.2)
BASOPHILS NFR BLD AUTO: 0.3 % (ref 0–2)
BILIRUB UR QL STRIP: NEGATIVE
EOSINOPHIL # BLD AUTO: 0.1 10^3/UL (ref 0–0.6)
EOSINOPHIL NFR BLD AUTO: 1 % (ref 0–6)
ERYTHROCYTE [DISTWIDTH] IN BLOOD BY AUTOMATED COUNT: 15.2 % (ref 11.5–14)
GLUCOSE UR STRIP-MCNC: NEGATIVE MG/DL
HCT VFR BLD CALC: 36.1 % (ref 36–47)
HGB BLD-MCNC: 12.1 G/DL (ref 12–15.5)
KETONES UR STRIP-MCNC: NEGATIVE MG/DL
LYMPHOCYTES # BLD AUTO: 1.3 10^3/UL (ref 0.5–4.7)
LYMPHOCYTES NFR BLD AUTO: 21.6 % (ref 13–45)
MCH RBC QN AUTO: 27.8 PG (ref 27–33.4)
MCHC RBC AUTO-ENTMCNC: 33.4 G/DL (ref 32–36)
MCV RBC AUTO: 83 FL (ref 80–97)
METHADONE UR QL SCN: NEGATIVE
MONOCYTES # BLD AUTO: 0.9 10^3/UL (ref 0.1–1.4)
MONOCYTES NFR BLD AUTO: 14.4 % (ref 3–13)
NEUTROPHILS # BLD AUTO: 3.8 10^3/UL (ref 1.7–8.2)
NEUTS SEG NFR BLD AUTO: 62.7 % (ref 42–78)
NITRITE UR QL STRIP: NEGATIVE
PCP UR QL SCN: NEGATIVE
PH UR STRIP: 6 [PH] (ref 5–9)
PLATELET # BLD: 99 10^3/UL (ref 150–450)
PROT UR STRIP-MCNC: 30 MG/DL
RBC # BLD AUTO: 4.33 10^6/UL (ref 3.72–5.28)
SP GR UR STRIP: 1.02
TOTAL CELLS COUNTED % (AUTO): 100 %
URINE AMPHETAMINES SCREEN: NEGATIVE
URINE BENZODIAZEPINES SCREEN: NEGATIVE
URINE COCAINE SCREEN: NEGATIVE
URINE MARIJUANA (THC) SCREEN: NEGATIVE
UROBILINOGEN UR-MCNC: NEGATIVE MG/DL (ref ?–2)
WBC # BLD AUTO: 6.1 10^3/UL (ref 4–10.5)

## 2019-03-11 PROCEDURE — S0028 INJECTION, FAMOTIDINE, 20 MG: HCPCS

## 2019-03-11 PROCEDURE — 86900 BLOOD TYPING SEROLOGIC ABO: CPT

## 2019-03-11 PROCEDURE — 80307 DRUG TEST PRSMV CHEM ANLYZR: CPT

## 2019-03-11 PROCEDURE — 81005 URINALYSIS: CPT

## 2019-03-11 PROCEDURE — 85027 COMPLETE CBC AUTOMATED: CPT

## 2019-03-11 PROCEDURE — 86592 SYPHILIS TEST NON-TREP QUAL: CPT

## 2019-03-11 PROCEDURE — 86850 RBC ANTIBODY SCREEN: CPT

## 2019-03-11 PROCEDURE — 84112 EVAL AMNIOTIC FLUID PROTEIN: CPT

## 2019-03-11 PROCEDURE — 86901 BLOOD TYPING SEROLOGIC RH(D): CPT

## 2019-03-11 PROCEDURE — 36415 COLL VENOUS BLD VENIPUNCTURE: CPT

## 2019-03-11 PROCEDURE — 88307 TISSUE EXAM BY PATHOLOGIST: CPT

## 2019-03-11 PROCEDURE — 85025 COMPLETE CBC W/AUTO DIFF WBC: CPT

## 2019-03-11 RX ADMIN — FERROUS SULFATE TAB 325 MG (65 MG ELEMENTAL FE) SCH MG: 325 (65 FE) TAB at 17:45

## 2019-03-11 RX ADMIN — DOCUSATE SODIUM SCH MG: 100 CAPSULE, LIQUID FILLED ORAL at 17:45

## 2019-03-11 RX ADMIN — IBUPROFEN SCH MG: 800 TABLET, FILM COATED ORAL at 21:18

## 2019-03-11 RX ADMIN — FAMOTIDINE SCH MG: 20 TABLET, FILM COATED ORAL at 21:18

## 2019-03-11 NOTE — DELIVERY SUMMARY
=================================================================

Del Sum A-C

=================================================================

Datetime Report Generated by CPN: 2019 17:32

   

   

=================================================================

DELIVERY PERSONNEL

=================================================================

   

DELIVERY PERSONNEL:  [id number]

Delivery Doctor::  Pushpa Kelley CNM

Labor and Delivery Nurse::  Emilee Moncada RN

Labor and Delivery Nurse::  Miriam Hernández RN

Scrub Tech/CNA:  Apolonia Jacob, CST

   

=================================================================

MATERNAL INFORMATION

=================================================================

   

Delivery Anesthesia:  Epidural

Medications After Delivery:  Pitocin Bolus-Please Comment; Pitocin Drip

   20 Units/1000ml NSS

Meds After Delivery Comment:  Pitocin 20 units in 1 L NS bolusing per

   order

Maternal Complications:  None

   

=================================================================

LABOR SUMMARY

=================================================================

   

EDC:  2019 00:00

No. Babies in Womb:  1

 Attempted:  No

Labor Anesthesia:  Epidural

   

=================================================================

LABOR INFORMATION

=================================================================

   

Reason for Induction:  Premature Rupture of Membranes

Onset of Labor:  2019 10:30

Complete Dilatation:  2019 13:25

Oxytocin:  Induction

Group B Beta Strep:  Negative

Antibiotics # of Doses:  n/a

Antibiotics Time of Last Dose:  n/a

Name of Antibiotic Given:  n/a

Steroids Given:  None

Reason Steroids Not Administered:  Not Applicable

   

=================================================================

MEMBRANES

=================================================================

   

Membranes Rupture Method:  Spontaneous

Rupture of Membranes:  2019 01:00

Length of Rupture (hr):  14.65

Amniotic Fluid Color:  Moderate Meconium

Amniotic Fluid Amount:  Small

Amniotic Fluid Odor:  Normal

   

=================================================================

STAGES OF LABOR

=================================================================

   

Stage 1 hr:  2

Stage 1 min:  55

Stage 2 hr:  2

Stage 2 min:  14

Stage 3 hr:  0

Stage 3 min:  6

Total Time in Labor hr:  5

Total Time in Labor min:  15

   

=================================================================

VAGINAL DELIVERY

=================================================================

   

Episiotomy:  None

Laceration #1:  Perineal

Laceration Extension #1:  Second Degree

Sponge Count Correct:  N/A

Sharps Count Correct:  N/A

   

=================================================================

CSECTION DELIVERY

=================================================================

   

Primary Indication:  N/A

Secondary Indication:  N/A

CSection Incidence:  N/A

Labor:  N/A

Elective:  N/A

CSection Incision:  N/A

   

=================================================================

BABY A INFORMATION

=================================================================

   

Infant Delivery Date/Time:  2019 15:39

Method of Delivery:  Vaginal

Born in Route :  No

:  N/A

Forceps:  N/A

Vacuum Extraction:  N/A

Shoulder Dystocia :  No

   

=================================================================

PRESENTATION/POSITION BABY A

=================================================================

   

Presentation:  Cephalic

Cephalic Presentation:  Vertex

Vertex Position:  Right Occipital Anterior

Breech Presentation:  N/A

   

=================================================================

PLACENTA INFORMATION BABY A

=================================================================

   

Placenta Delivery Time :  2019 15:45

Placenta Method of Delivery:  Spontaneous (Annotations: Data stored by

   [facility] on behalf of user)

Placenta Status:  Delivered

   

=================================================================

APGAR SCORES BABY A

=================================================================

   

Heart Rate 1 min:  >100 bpm

Resp Effort 1 min:  Slow, Irregular

Reflex Irritability 1 min:  Cough or Sneeze or Pulls Away

Muscle Tone 1 min:  Active Motion

Color 1 min:  Body Pink, Extremities Blue

Resuscitation Effort 1 min:  N/A

APGAR SCORE 1 MIN:  8

Heart Rate 5 min:  >100 bpm

Resp Effort 5 min:  Good Cry

Reflex Irritability 5 min:  Cough or Sneeze or Pulls Away

Muscle Tone 5 min:  Active Motion

Color 5 min:  Body Pink, Extremities Blue

Resuscitation Effort 5 min:  Tactile Stimulation

APGAR SCORE 5 MIN:  9

   

=================================================================

INFANT INFORMATION BABY A

=================================================================

   

Gestational Age at Delivery:  38.3

Gestational Status:  Early Term- 37- 38.6 Weeks

Infant Outcome :  Liveborn

Infant Condition :  Stable

Infant Sex:  Male

   

=================================================================

IDENTIFICATION BABY A

=================================================================

   

Infant Verification Date/Time:  2019 17:24

ID Band Number:  [id number]

Mother's Name Verified:  Yes

Infant Medical Record Number:  [id number]

RN Verifying Infant:  ANNALISA Moncada, RN

   

=================================================================

WEIGHT/LENGTH BABY A

=================================================================

   

Infant Birthweight (gm):  2929

Infant Weight (lb):  6

Infant Weight (oz):  7

Infant Length (in):  20.00

Infant Length (cm):  50.80

   

=================================================================

CORD INFORMATION BABY A

=================================================================

   

No. Cord Vessels:  3

Nuchal Cord :  Around Neck x2, Loose

Cord Blood Taken:  Yes-For Eval (Mom's Blood Type - or O+)

Infant Suction:  Mouth; Nose

   

=================================================================

ASSESSMENT BABY A

=================================================================

   

Infant Complications:  Multiple Variable Decels

Physical Findings at Delivery:  Within Normal Limits

Infant Respirations:  Appears Normal

Skin to Skin:  Yes

Skin to Skin Time (min):  60

Neonatologist/ALS Called :  No

Infant Care By:  ANNALISA Moncada RN

Transferred To:  Remains with Mother

   

=================================================================

BABY B INFORMATION

=================================================================

   

 :  N/A

## 2019-03-11 NOTE — ADMISSION PHYSICAL
=================================================================



=================================================================

Datetime Report Generated by CPN: 2019 04:36

   

   

=================================================================

CURRENT ADMISSION

=================================================================

   

Chief Complaint:  Suspected Ruptured Membranes

Indication for Induction:  Not Applicable

Admit Impression :  Term, Intrauterine Pregnancy; No Active Labor;

   Ruptured Membranes

Admit Plan:  Admit to Unit; Initiate Labor Protocol

   

=================================================================

ALLERGIES

=================================================================

   

Medication Allergies:  No

Medication Allergies:  No Known Drug Allergies (2019); pineapple

   (2019)

Latex:  No Latex Allergies

   

=================================================================

OBSTETRICAL HISTORY

=================================================================

   

EDC:  2019 00:00

:  1

Para:  0

Term:  0

:  0

SAB:  0

IAB:  0

Ectopic:  0

Livin

Cesareans:  0

VBACs:  0

Multiple Births:  0

Gestational Diabetes:  No

Rh Sensitization:  No

Incompetent Cervix:  No

KIP:  No

Infertility:  No

ART Treatment:  No

Uterine Anomaly:  No

IUGR:  No

Hx Previous C/S:  No

Macrosomia:  No

Hx Loss/Stillborn:  No

PIH:  No

Hx  Death:  No

Placenta Previa/Abruption:  No

Depression/PP Depression:  No

PTL/PROM:  No

Post Partum Hemorrhage:  No

Current Pregnancy Procedures:  Ultrasound; NST

Obstetrical History Comments:  G1- current

   

=================================================================

***SEE PRENATAL RECORDS***

=================================================================

   

Alcohol:  No

Marijuana :  No

Cocaine:  No

Other Illicit Drugs:  No

Cigarettes:  Never Smoker. [id number]

   

=================================================================

MEDICAL HISTORY

=================================================================

   

Diabetes:  No

Blood Transfusion:  No

Pulmonary Disease (Asthma, TB):  No

Breast Disease:  No

Hypertension:  No

Gyn Surgery:  No

Heart Disease:  No

Hosp/Surgery:  No

Autoimmune Disorder:  No

Anesthetic Complications:  No

Kidney Disease:  No

Abnormal Pap Smear:  No

Neuro/Epilepsy:  No

Psychiatric Disorders:  No

Other Medical Diseases:  No

Hepatitis/Liver Disease:  No

Significant Family History:  No

Varicosities/Phlebitis:  No

Trauma/Violence :  No

Thyroid Dysfunction:  No

   

=================================================================

INFECTIOUS HISTORY

=================================================================

   

Gonorrhea:  No

Genital Herpes:  No

Chlamydia:  No

Tuberculosis:  No

Syphilis:  No

Hepatitis:  No

HIV/AIDS Exposure:  No

Rash or Viral Illness:  No

HPV:  No

   

=================================================================

PHYSICAL EXAM

=================================================================

   

General:  Normal

HEENT:  Normal

Neurologic:  Normal

Thyroid:  Normal

Heart:  Normal

Lungs:  Normal

Breast:  Normal

Back:  Normal

Abdomen:  Normal

Genitourinary Exam:  Normal

Extremities:  Normal

DTRs:  Normal

Pelvic Type:  Adequate

Vital Signs:  Reviewed; Within Normal Limits

   

=================================================================

VAGINAL EXAM

=================================================================

   

Dilatation:  1

Effacement:  thick

Station:  -3

Contraction Comments:  irregular

   

=================================================================

MEMBRANES

=================================================================

   

Pooling:  Positive

Ferning Results:  Positive

Membranes:  Ruptured

Amniotic Fluid Color:  Clear

   

=================================================================

FETUS A

=================================================================

   

EGA:  38.3

Monitoring:  External US

FHR- Baseline:  140-150s

Variability:  Moderate 6-25bpm

Accelerations:  15X15

Decelerations:  None

FHR Category:  Category I

Admit Comment:  PROM at 0100--clear fluid; GBS Negative

   

=================================================================

INFORMED CONSENT

=================================================================

   

Signature:  Electronically signed by MD PADMINI Osborn) on

   3/11/2019 at 04:36  with User ID: [email or web address]

## 2019-03-12 LAB
ERYTHROCYTE [DISTWIDTH] IN BLOOD BY AUTOMATED COUNT: 15.4 % (ref 11.5–14)
HCT VFR BLD CALC: 32.5 % (ref 36–47)
HGB BLD-MCNC: 10.7 G/DL (ref 12–15.5)
MCH RBC QN AUTO: 27.6 PG (ref 27–33.4)
MCHC RBC AUTO-ENTMCNC: 32.9 G/DL (ref 32–36)
MCV RBC AUTO: 84 FL (ref 80–97)
PLATELET # BLD: 112 10^3/UL (ref 150–450)
RBC # BLD AUTO: 3.87 10^6/UL (ref 3.72–5.28)
WBC # BLD AUTO: 11.4 10^3/UL (ref 4–10.5)

## 2019-03-12 RX ADMIN — DOCUSATE SODIUM SCH MG: 100 CAPSULE, LIQUID FILLED ORAL at 18:29

## 2019-03-12 RX ADMIN — Medication SCH CAP: at 09:17

## 2019-03-12 RX ADMIN — IBUPROFEN SCH MG: 800 TABLET, FILM COATED ORAL at 06:29

## 2019-03-12 RX ADMIN — FAMOTIDINE SCH MG: 20 TABLET, FILM COATED ORAL at 22:19

## 2019-03-12 RX ADMIN — DOCUSATE SODIUM SCH MG: 100 CAPSULE, LIQUID FILLED ORAL at 09:12

## 2019-03-12 RX ADMIN — IBUPROFEN SCH MG: 800 TABLET, FILM COATED ORAL at 22:16

## 2019-03-12 RX ADMIN — FERROUS SULFATE TAB 325 MG (65 MG ELEMENTAL FE) SCH MG: 325 (65 FE) TAB at 18:29

## 2019-03-12 RX ADMIN — FERROUS SULFATE TAB 325 MG (65 MG ELEMENTAL FE) SCH MG: 325 (65 FE) TAB at 09:17

## 2019-03-12 RX ADMIN — FAMOTIDINE SCH MG: 20 TABLET, FILM COATED ORAL at 09:17

## 2019-03-12 RX ADMIN — SENNOSIDES, DOCUSATE SODIUM SCH EACH: 50; 8.6 TABLET, FILM COATED ORAL at 09:18

## 2019-03-12 RX ADMIN — ACETAMINOPHEN AND CODEINE PHOSPHATE PRN EACH: 300; 30 TABLET ORAL at 14:39

## 2019-03-12 RX ADMIN — IBUPROFEN SCH MG: 800 TABLET, FILM COATED ORAL at 13:17

## 2019-03-12 NOTE — PDOC PROGRESS REPORT
Subjective-OB


Progress Note for:: 03/12/19


Subjective: 


reports bleeding slowing, pain controlled with current meds. denies needs.





Physical Exam (OB)


Vital Signs: 


                                        











Temp Pulse Resp BP Pulse Ox


 


 97.6 F   77   18   114/58 L  100 


 


 03/12/19 07:53  03/12/19 07:53  03/12/19 07:53  03/12/19 07:53  03/12/19 07:53








                                 Intake & Output











 03/11/19 03/12/19 03/13/19





 06:59 06:59 06:59


 


Weight 121.6 kg  














- Abdomen


Description: Soft


Fundal Description: Firm, Midline


Fundal Height: u/u - u/2





- Abdominal


Distension: No distension


Tenderness: Nontender





- Extremities


Lower extremities: Catracho's sign - neg


Calf: Normal, Nontender





Objective-Diagnostic


Laboratory: 


                                        





                                 03/12/19 06:42 





                                        











  03/12/19





  06:42


 


WBC  11.4 H


 


RBC  3.87


 


Hgb  10.7 L


 


Hct  32.5 L


 


MCV  84


 


MCH  27.6


 


MCHC  32.9


 


RDW  15.4 H


 


Plt Count  112 L














Assessment and Plan(PN)





- Assessment and Plan


(1) Normal vaginal delivery


Is this a current diagnosis for this admission?: Yes   





(2) Obstetrical laceration, second degree


Is this a current diagnosis for this admission?: Yes   





- Time Spent with Patient


Time with patient: Less than 15 minutes


Medications reviewed and adjusted accordingly: Yes





- Disposition


Anticipated Discharge: Home


Within: within 24 hours

## 2019-03-13 VITALS — SYSTOLIC BLOOD PRESSURE: 123 MMHG | DIASTOLIC BLOOD PRESSURE: 63 MMHG

## 2019-03-13 RX ADMIN — DOCUSATE SODIUM SCH MG: 100 CAPSULE, LIQUID FILLED ORAL at 11:17

## 2019-03-13 RX ADMIN — Medication SCH CAP: at 11:17

## 2019-03-13 RX ADMIN — FERROUS SULFATE TAB 325 MG (65 MG ELEMENTAL FE) SCH MG: 325 (65 FE) TAB at 11:17

## 2019-03-13 RX ADMIN — IBUPROFEN SCH: 800 TABLET, FILM COATED ORAL at 06:18

## 2019-03-13 RX ADMIN — FAMOTIDINE SCH MG: 20 TABLET, FILM COATED ORAL at 11:17

## 2019-03-13 RX ADMIN — SENNOSIDES, DOCUSATE SODIUM SCH EACH: 50; 8.6 TABLET, FILM COATED ORAL at 11:17

## 2019-03-13 RX ADMIN — IBUPROFEN SCH MG: 800 TABLET, FILM COATED ORAL at 13:46

## 2019-03-13 RX ADMIN — ACETAMINOPHEN AND CODEINE PHOSPHATE PRN EACH: 300; 30 TABLET ORAL at 06:17

## 2019-03-13 NOTE — PDOC DISCHARGE SUMMARY
Final Diagnosis


Discharge Date: 03/13/19





- Final Diagnosis


(1) Obstetrical laceration, second degree


Is this a current diagnosis for this admission?: Yes   





(2) Normal vaginal delivery


Is this a current diagnosis for this admission?: Yes   





(3) Acute blood loss anemia


Is this a current diagnosis for this admission?: Yes   





(4) Thrombocytopenia affecting pregnancy


Is this a current diagnosis for this admission?: Yes   





Discharge Data





- Discharge Medication


Prescriptions: 


Ibuprofen [Motrin 800 mg Tablet] 800 mg PO Q8HP PRN #30 tablet


 PRN Reason: 


Docusate Sodium [Colace 100 mg Capsule] 100 mg PO BID #60 capsule


Ferrous Sulfate [Feosol 325 mg Tablet] 325 mg PO BID #60 tablet


Prenatal Vit/Dha [Prenatal Multi + Dha Capsule] 1 cap PO DAILY #90 capsule


Home Medications: 








Docusate Sodium [Colace 100 mg Capsule] 100 mg PO BID #60 capsule 03/13/19 


Ferrous Sulfate [Feosol 325 mg Tablet] 325 mg PO BID #60 tablet 03/13/19 


Ibuprofen [Motrin 800 mg Tablet] 800 mg PO Q8HP PRN #30 tablet 03/13/19 


Prenatal Vit/Dha [Prenatal Multi + Dha Capsule] 1 cap PO DAILY #90 capsule 

03/13/19 








Reason(s) for Admission: PROM


Prenatal Procedures: Ultrasound


Intrapartum Procedure(s): Spontaneous Vaginal Delivery


Postpartum Complication(s): Laceration-Perineal


Laceration-Degree: 2nd





- Diagnosis Test


Laboratory: 


                                        











Temp Pulse Resp BP Pulse Ox


 


 98.0 F   76   16   117/67   100 


 


 03/13/19 07:42  03/13/19 07:42  03/13/19 07:42  03/13/19 07:42  03/13/19 07:42








                                        











  03/11/19 03/11/19 03/12/19





  01:32 03:13 06:42


 


RBC   4.33  3.87


 


Hgb   12.1  10.7 L


 


Hct   36.1  32.5 L


 


Urine Opiates Screen  NEGATIVE  














- Discharge information/Instructions


Discharge Activity: Activity As Tolerated, Balance Activity w/Rest, No Lifting 

Over 10 Pounds, No Lifting/Push/Pulling, Pelvic Rest, No tub bath, Walk 

Frequently


Discharge Diet: As Tolerated, Regular


Disposition: HOME, SELF-CARE


Follow up with: [facility]


in: 4, Weeks

## 2019-09-10 ENCOUNTER — HOSPITAL ENCOUNTER (EMERGENCY)
Dept: HOSPITAL 62 - ER | Age: 29
Discharge: HOME | End: 2019-09-10
Payer: MEDICAID

## 2019-09-10 VITALS — SYSTOLIC BLOOD PRESSURE: 119 MMHG | DIASTOLIC BLOOD PRESSURE: 95 MMHG

## 2019-09-10 DIAGNOSIS — D25.9: ICD-10-CM

## 2019-09-10 DIAGNOSIS — R42: ICD-10-CM

## 2019-09-10 DIAGNOSIS — Z3A.01: ICD-10-CM

## 2019-09-10 DIAGNOSIS — O30.001: ICD-10-CM

## 2019-09-10 DIAGNOSIS — O34.11: ICD-10-CM

## 2019-09-10 DIAGNOSIS — O26.891: Primary | ICD-10-CM

## 2019-09-10 DIAGNOSIS — Z91.018: ICD-10-CM

## 2019-09-10 DIAGNOSIS — R35.0: ICD-10-CM

## 2019-09-10 DIAGNOSIS — R11.0: ICD-10-CM

## 2019-09-10 LAB
ADD MANUAL DIFF: NO
ALBUMIN SERPL-MCNC: 4.4 G/DL (ref 3.5–5)
ALP SERPL-CCNC: 50 U/L (ref 38–126)
ANION GAP SERPL CALC-SCNC: 10 MMOL/L (ref 5–19)
APPEARANCE UR: (no result)
APTT PPP: YELLOW S
AST SERPL-CCNC: 19 U/L (ref 14–36)
BASOPHILS # BLD AUTO: 0 10^3/UL (ref 0–0.2)
BASOPHILS NFR BLD AUTO: 0.6 % (ref 0–2)
BILIRUB DIRECT SERPL-MCNC: 0.1 MG/DL (ref 0–0.4)
BILIRUB SERPL-MCNC: 0.3 MG/DL (ref 0.2–1.3)
BILIRUB UR QL STRIP: NEGATIVE
BUN SERPL-MCNC: 12 MG/DL (ref 7–20)
CALCIUM: 9.6 MG/DL (ref 8.4–10.2)
CHLORIDE SERPL-SCNC: 104 MMOL/L (ref 98–107)
CO2 SERPL-SCNC: 21 MMOL/L (ref 22–30)
EOSINOPHIL # BLD AUTO: 0.1 10^3/UL (ref 0–0.6)
EOSINOPHIL NFR BLD AUTO: 1.5 % (ref 0–6)
ERYTHROCYTE [DISTWIDTH] IN BLOOD BY AUTOMATED COUNT: 13.6 % (ref 11.5–14)
GLUCOSE SERPL-MCNC: 84 MG/DL (ref 75–110)
GLUCOSE UR STRIP-MCNC: NEGATIVE MG/DL
HCT VFR BLD CALC: 39.7 % (ref 36–47)
HGB BLD-MCNC: 13.3 G/DL (ref 12–15.5)
KETONES UR STRIP-MCNC: NEGATIVE MG/DL
LYMPHOCYTES # BLD AUTO: 1.8 10^3/UL (ref 0.5–4.7)
LYMPHOCYTES NFR BLD AUTO: 34.4 % (ref 13–45)
MCH RBC QN AUTO: 27.2 PG (ref 27–33.4)
MCHC RBC AUTO-ENTMCNC: 33.4 G/DL (ref 32–36)
MCV RBC AUTO: 82 FL (ref 80–97)
MONOCYTES # BLD AUTO: 0.5 10^3/UL (ref 0.1–1.4)
MONOCYTES NFR BLD AUTO: 9.4 % (ref 3–13)
NEUTROPHILS # BLD AUTO: 2.8 10^3/UL (ref 1.7–8.2)
NEUTS SEG NFR BLD AUTO: 54.1 % (ref 42–78)
NITRITE UR QL STRIP: NEGATIVE
PH UR STRIP: 6 [PH] (ref 5–9)
PLATELET # BLD: 193 10^3/UL (ref 150–450)
POTASSIUM SERPL-SCNC: 3.8 MMOL/L (ref 3.6–5)
PROT SERPL-MCNC: 8.2 G/DL (ref 6.3–8.2)
PROT UR STRIP-MCNC: NEGATIVE MG/DL
RBC # BLD AUTO: 4.88 10^6/UL (ref 3.72–5.28)
SP GR UR STRIP: 1.01
TOTAL CELLS COUNTED % (AUTO): 100 %
UROBILINOGEN UR-MCNC: NEGATIVE MG/DL (ref ?–2)
WBC # BLD AUTO: 5.3 10^3/UL (ref 4–10.5)

## 2019-09-10 PROCEDURE — 81025 URINE PREGNANCY TEST: CPT

## 2019-09-10 PROCEDURE — S0119 ONDANSETRON 4 MG: HCPCS

## 2019-09-10 PROCEDURE — 81001 URINALYSIS AUTO W/SCOPE: CPT

## 2019-09-10 PROCEDURE — 87186 SC STD MICRODIL/AGAR DIL: CPT

## 2019-09-10 PROCEDURE — 87086 URINE CULTURE/COLONY COUNT: CPT

## 2019-09-10 PROCEDURE — 76817 TRANSVAGINAL US OBSTETRIC: CPT

## 2019-09-10 PROCEDURE — 80053 COMPREHEN METABOLIC PANEL: CPT

## 2019-09-10 PROCEDURE — 87088 URINE BACTERIA CULTURE: CPT

## 2019-09-10 PROCEDURE — 85025 COMPLETE CBC W/AUTO DIFF WBC: CPT

## 2019-09-10 PROCEDURE — 99284 EMERGENCY DEPT VISIT MOD MDM: CPT

## 2019-09-10 PROCEDURE — 84702 CHORIONIC GONADOTROPIN TEST: CPT

## 2019-09-10 PROCEDURE — 93976 VASCULAR STUDY: CPT

## 2019-09-10 PROCEDURE — 36415 COLL VENOUS BLD VENIPUNCTURE: CPT

## 2019-09-10 NOTE — RADIOLOGY REPORT (SQ)
US PELVIS



EXAM DATE: 9/10/2019 9:05 PM CDT



HISTORY: Early pregnancy. Pelvic pain.



COMPARISON: None.



TECHNIQUE: Grayscale, color Doppler, and spectral Doppler

ultrasound images of the pelvis were obtained.



FINDINGS: 



There are 2 intrauterine gestational sacs with diameters

measuring 1.51 cm and 1.35 cm. This corresponds to 6 weeks 2 days

of pregnancy. The cervix measures 4.6 cm in length. There is a

3.9 cm fibroid in the posterior uterine segment and adjacent 3.4

cm fibroid as well as a 2.2 cm fibroid in the anterior segment.



Right ovary measures 4.2 cm and the left ovary measures 4.1 cm.

Normal color Doppler blood flow is seen in both ovaries. There is

a 2.2 cm likely involuting corpus luteum cyst in the left ovary.

There is also a 2.3 cm anechoic cyst in the right ovary.



IMPRESSION:



1.  Twin pregnancy with 2 adjacent intrauterine gestational sacs

corresponding to 6 weeks 2 days of pregnancy. No fetal pole is

seen at this time.

2.  Fibroid uterus.

3.  2.4 cm likely corpus luteum left ovarian cyst and 2.2 cm

likely simple right ovarian cyst.

## 2019-09-10 NOTE — ER DOCUMENT REPORT
ED General





- General


Chief Complaint: Dizziness


Stated Complaint: DIZZINESS


Time Seen by Provider: 09/10/19 17:39


Mode of Arrival: Ambulatory


Notes: 





Patient is a 29-year-old G1, P1 female who presents to the emergency department 

with a chief complaint of dizziness and nausea.  Patient reports last night she 

became nauseous without vomiting.  Patient states today she has been dizzy.  

Patient denies headache.  Patient states she has not had any diarrhea or 

abdominal pain.  Patient denies pelvic cramping.  Patient denies vaginal 

bleeding or discharge.  Patient denies fever.  Patient reports her last 

menstrual cycle was at the end of July, estimating around July 30th.  Patient 

states she has not taken any home pregnancy test.  Patient reports urinary 

frequency.


TRAVEL OUTSIDE OF THE [place] IN LAST 30 DAYS: No





- Related Data


Allergies/Adverse Reactions: 


                                        





No Known Drug Allergies Allergy (Verified 09/10/19 16:46)


   


pineapple [Pineapple] Allergy (Verified 09/10/19 16:46)


   











Past Medical History





- General


Information source: Patient





- Social History


Smoking Status: Never Smoker


Chew tobacco use (# tins/day): No


Frequency of alcohol use: None


Drug Abuse: None


Lives with: Spouse/Significant other


Family History: Reviewed & Not Pertinent


Patient has suicidal ideation: No


Patient has homicidal ideation: No





- Past Medical History


Cardiac Medical History: Reports: None


Pulmonary Medical History: Reports: Hx Bronchitis


EENT Medical History: Reports: None


Neurological Medical History: Reports: Hx Migraine


Endocrine Medical History: Reports: None


Renal/ Medical History: Reports: None.  Denies: Hx Peritoneal Dialysis


Malignancy Medical History: Reports: None


GI Medical History: Reports: None


Musculoskeletal Medical History: Reports None


Skin Medical History: Reports None


Psychiatric Medical History: Reports: Hx Depression


Traumatic Medical History: Reports: None


Infectious Medical History: Reports: None


Surgical Hx: Negative





- Immunizations


Hx Diphtheria, Pertussis, Tetanus Vaccination: Yes





Review of Systems





- Review of Systems


Constitutional: See HPI


EENT: No symptoms reported


Cardiovascular: See HPI


Respiratory: No symptoms reported


Gastrointestinal: See HPI


Genitourinary: See HPI


Female Genitourinary: See HPI


Musculoskeletal: No symptoms reported


Skin: No symptoms reported


Hematologic/Lymphatic: No symptoms reported


Neurological/Psychological: No symptoms reported





Physical Exam





- Vital signs


Vitals: 


                                        











Temp Pulse Resp BP Pulse Ox


 


 99.1 F   72   16   123/66   100 


 


 09/10/19 17:00  09/10/19 17:00  09/10/19 17:00  09/10/19 17:00  09/10/19 17:00











Interpretation: Normal





- Notes


Notes: 





GENERAL: Well-appearing, well-nourished and in no acute distress.


HEAD: Atraumatic, normocephalic.


EYES: Pupils equal round and reactive to light, extraocular movements intact, 

sclera anicteric, conjunctiva are normal.


ENT: TMs normal, nares patent, oropharynx clear without exudates.  Moist mucous 

membranes.


NECK: Normal range of motion, supple without lymphadenopathy or JVD.


LUNGS: Breath sounds clear to auscultation bilaterally and equal.  No wheezes 

rales or rhonchi.


HEART: Regular rate and rhythm without murmurs, rubs or gallops.


ABDOMEN: Soft, nontender, normoactive bowel sounds.  No guarding, no rebound.  

No masses appreciated.


BACK: No cervical, thoracic, lumbar midline tenderness.  No saddle anesthesia, 

normal distal neurovascular exam.  No CVA tenderness. 


GENITOURINARY: Deferred.


EXTREMITIES: Normal range of motion, no pitting or edema.  No clubbing or 

cyanosis.


NEUROLOGICAL: Cranial nerves II through XII grossly intact.  Normal speech, 

normal gait.


PSYCH: Normal mood, normal affect.


SKIN: Warm, Dry, normal turgor, no rashes or lesions noted.





Course





- Re-evaluation


Re-evalutation: 





09/10/19 22:44


Patient does have a moderate amount of leuks in the urine with a very small 

amount of white blood cells.  Due to the patient's report of urinary frequency I

 have also ordered a urine culture and we will treat the patient for urinary 

tract infection.





- Vital Signs


Vital signs: 


                                        











Temp Pulse Resp BP Pulse Ox


 


 97.9 F   72   12   133/71 H  100 


 


 09/10/19 21:45  09/10/19 21:45  09/10/19 21:45  09/10/19 21:45  09/10/19 21:45














- Laboratory


Result Diagrams: 


                                 09/10/19 18:13





                                 09/10/19 18:13


Laboratory results interpreted by me: 


                                        











  09/10/19 09/10/19 09/10/19





  18:13 18:13 18:13


 


Sodium  135.3 L  


 


Carbon Dioxide  21 L  


 


Beta HCG, Quant    75542.00 H


 


Ur Leukocyte Esterase   MODERATE H 


 


Urine HCG, Qual   POSITIVE H 











09/10/19 22:43


Patient does not have a leukocytosis or anemia.  Patient does not have her 

alteration in her electrolytes were kidney function, liver function.  Patient's 

serum quant was positive at 17,234.  Patient does have a moderate amount of 

leuks in the urine.


Laboratory











  09/10/19 09/10/19 09/10/19





  18:13 18:13 18:13


 


WBC  5.3  


 


RBC  4.88  


 


Hgb  13.3  


 


Hct  39.7  


 


MCV  82  


 


MCH  27.2  


 


MCHC  33.4  


 


RDW  13.6  


 


Plt Count  193  


 


Lymph % (Auto)  34.4  


 


Mono % (Auto)  9.4  


 


Eos % (Auto)  1.5  


 


Baso % (Auto)  0.6  


 


Absolute Neuts (auto)  2.8  


 


Absolute Lymphs (auto)  1.8  


 


Absolute Monos (auto)  0.5  


 


Absolute Eos (auto)  0.1  


 


Absolute Basos (auto)  0.0  


 


Seg Neutrophils %  54.1  


 


Sodium   135.3 L 


 


Potassium   3.8 


 


Chloride   104 


 


Carbon Dioxide   21 L 


 


Anion Gap   10 


 


BUN   12 


 


Creatinine   0.55 


 


Est GFR ( Amer)   > 60 


 


Est GFR (MDRD) Non-Af   > 60 


 


Glucose   84 


 


Calcium   9.6 


 


Total Bilirubin   0.3 


 


Direct Bilirubin   0.1 


 


Neonat Total Bilirubin   Not Reportable 


 


Neonat Direct Bilirubin   Not Reportable 


 


Neonat Indirect Bili   Not Reportable 


 


AST   19 


 


ALT   16 


 


Alkaline Phosphatase   50 


 


Total Protein   8.2 


 


Albumin   4.4 


 


Beta HCG, Quant   


 


Total Beta HCG   


 


Urine Color    YELLOW


 


Urine Appearance    SLIGHTLY-CLOUDY


 


Urine pH    6.0


 


Ur Specific Gravity    1.015


 


Urine Protein    NEGATIVE


 


Urine Glucose (UA)    NEGATIVE


 


Urine Ketones    NEGATIVE


 


Urine Blood    NEGATIVE


 


Urine Nitrite    NEGATIVE


 


Urine Bilirubin    NEGATIVE


 


Urine Urobilinogen    NEGATIVE


 


Ur Leukocyte Esterase    MODERATE H


 


Urine WBC (Auto)    3


 


Urine RBC (Auto)    1


 


Squamous Epi Cells Auto    9


 


Amorphous Sediment Auto    TRACE


 


Urine Mucus (Auto)    RARE


 


Urine Ascorbic Acid    NEGATIVE


 


Urine HCG, Qual    POSITIVE H














  09/10/19





  18:13


 


WBC 


 


RBC 


 


Hgb 


 


Hct 


 


MCV 


 


MCH 


 


MCHC 


 


RDW 


 


Plt Count 


 


Lymph % (Auto) 


 


Mono % (Auto) 


 


Eos % (Auto) 


 


Baso % (Auto) 


 


Absolute Neuts (auto) 


 


Absolute Lymphs (auto) 


 


Absolute Monos (auto) 


 


Absolute Eos (auto) 


 


Absolute Basos (auto) 


 


Seg Neutrophils % 


 


Sodium 


 


Potassium 


 


Chloride 


 


Carbon Dioxide 


 


Anion Gap 


 


BUN 


 


Creatinine 


 


Est GFR ( Amer) 


 


Est GFR (MDRD) Non-Af 


 


Glucose 


 


Calcium 


 


Total Bilirubin 


 


Direct Bilirubin 


 


Neonat Total Bilirubin 


 


Neonat Direct Bilirubin 


 


Neonat Indirect Bili 


 


AST 


 


ALT 


 


Alkaline Phosphatase 


 


Total Protein 


 


Albumin 


 


Beta HCG, Quant  39030.00 H


 


Total Beta HCG  POSITIVE


 


Urine Color 


 


Urine Appearance 


 


Urine pH 


 


Ur Specific Gravity 


 


Urine Protein 


 


Urine Glucose (UA) 


 


Urine Ketones 


 


Urine Blood 


 


Urine Nitrite 


 


Urine Bilirubin 


 


Urine Urobilinogen 


 


Ur Leukocyte Esterase 


 


Urine WBC (Auto) 


 


Urine RBC (Auto) 


 


Squamous Epi Cells Auto 


 


Amorphous Sediment Auto 


 


Urine Mucus (Auto) 


 


Urine Ascorbic Acid 


 


Urine HCG, Qual 














- Diagnostic Test


Radiology reviewed: Reports reviewed


Radiology results interpreted by me: 





09/10/19 23:50





                                        





Transvaginal US  09/10/19 21:05


IMPRESSION:


 


1.  Twin pregnancy with 2 adjacent intrauterine gestational sacs


corresponding to 6 weeks 2 days of pregnancy. No fetal pole is


seen at this time.


2.  Fibroid uterus.


3.  2.4 cm likely corpus luteum left ovarian cyst and 2.2 cm


likely simple right ovarian cyst.


 














Discharge





- Discharge


Clinical Impression: 


 Dizziness, Nausea, Urinary frequency





Pregnancy


Qualifiers:


 Weeks of gestation: less than 8 weeks Qualified Code(s): Z3A.01 - Less than 8 

weeks gestation of pregnancy





Condition: Stable


Disposition: HOME, SELF-CARE


Additional Instructions: 


Today you are seen in the emergency department for dizziness and nausea.  From 

the blood work we did find out that you are pregnant.  The ultrasound did 

confirm that you have a twin pregnancy with 2 intrauterine gestational sacs 

corresponding to about 6 weeks and 2 days of pregnancy.  It also noted that you 

do have a fibroid uterus which you were aware of.  And two small ovarian cysts o

ne on the left and one on the right.  Please call [facility] 

tomorrow to make an appointment and establish care.  Use Tylenol as needed for 

pain.  Please refrain from Motrin or other NSAIDs as you cannot take this while 

pregnant.  Do not smoke, drink alcohol or use any recreational drugs.  Please 

start using a prenatal vitamin.  You do have some bacteria in the urine and 

since you are having urinary frequency I will go ahead and treat you with 

Keflex.  Keflex is an antibiotic that you will take twice a day for 5 days.  You

 have received your first dose here in the emergency department.  This 

antibiotic is okay for the babies.  Please return to the emergency department if

 you develop abdominal pain, fever, vaginal bleeding, severe abdominal cramping 

or any other concerning signs or symptoms.


Prescriptions: 


Cephalexin Monohydrate [Keflex 500 mg Capsule] 500 mg PO BID 5 Days #10 capsule

## 2019-09-10 NOTE — ER DOCUMENT REPORT
ED Medical Screen (RME)





- General


Chief Complaint: Dizziness


Stated Complaint: DIZZINESS


Time Seen by Provider: 09/10/19 17:39


Mode of Arrival: Ambulatory


Information source: Patient


Notes: 





Patient is an otherwise healthy 29-year-old female presenting to the emergency 

department chief complaint of dizziness and nausea.  Patient reports the nausea 

started a few days ago and the dizziness started today.  She denies a feeling of

the room spinning but rather states it is a generalized lightheadedness.  She 

has not fallen or passed out.





Exam:


Patient alert, oriented, answering all questions appropriately.


No focal neurological deficits noted.


Lung sounds clear and equal bilaterally.





I have greeted and performed a rapid initial assessment of this patient.  A 

comprehensive ED assessment and evaluation of the patient, analysis of test 

results and completion of the medical decision making process will be conducted 

by additional ED providers.  I have specifically instructed the patient or 

family members with the patient to immediately return to any nursing staff 

should anything change in the patient's condition or with their chief complaint.





This medical record was dictated with voice recognizing software.  There may be 

grammatical, syntax errors that are unintended.





TRAVEL OUTSIDE OF THE [place] IN LAST 30 DAYS: No





- Related Data


Allergies/Adverse Reactions: 


                                        





No Known Drug Allergies Allergy (Verified 09/10/19 16:46)


   


pineapple [Pineapple] Allergy (Verified 09/10/19 16:46)


   











Past Medical History





- Social History


Family history: None


Pulmonary Medical History: Reports: Hx Bronchitis


Neurological Medical History: Reports: Hx Migraine


Renal/ Medical History: Denies: Hx Peritoneal Dialysis


Psychiatric Medical History: Reports: Hx Depression





- Immunizations


Hx Diphtheria, Pertussis, Tetanus Vaccination: Yes





Physical Exam





- Vital signs


Vitals: 





                                        











Temp Pulse Resp BP Pulse Ox


 


 99.1 F   72   16   123/66   100 


 


 09/10/19 17:00  09/10/19 17:00  09/10/19 17:00  09/10/19 17:00  09/10/19 17:00














Course





- Vital Signs


Vital signs: 





                                        











Temp Pulse Resp BP Pulse Ox


 


 99.1 F   72   16   123/66   100 


 


 09/10/19 17:00  09/10/19 17:00  09/10/19 17:00  09/10/19 17:00  09/10/19 17:00

## 2019-12-15 ENCOUNTER — HOSPITAL ENCOUNTER (EMERGENCY)
Dept: HOSPITAL 62 - ER | Age: 29
Discharge: HOME | End: 2019-12-15
Payer: MEDICAID

## 2019-12-15 VITALS — DIASTOLIC BLOOD PRESSURE: 66 MMHG | SYSTOLIC BLOOD PRESSURE: 117 MMHG

## 2019-12-15 DIAGNOSIS — Z3A.19: ICD-10-CM

## 2019-12-15 DIAGNOSIS — M54.31: ICD-10-CM

## 2019-12-15 DIAGNOSIS — O26.892: Primary | ICD-10-CM

## 2019-12-15 PROCEDURE — 99283 EMERGENCY DEPT VISIT LOW MDM: CPT

## 2019-12-15 NOTE — ER DOCUMENT REPORT
HPI





- HPI


Time Seen by Provider: 12/15/19 12:31


Pain Level: 4


Notes: 





Otherwise healthy 29-year-old female presents emergency department with chief 

complaint of pain in her left lumbar area that radiates down the back of her 

leg.  Patient denies any direct trauma.  She states this is been going on for 

several days.  She denies any heavy lifting.  She is pregnant.  She denies any 

abdominal pain, nausea, vomiting, diarrhea, vaginal bleeding or dysuria.  She 

reports the pain is worse with movement.








- REPRODUCTIVE


Reproductive: REPORTS: Pregnant:





Past Medical History





- General


Information source: Patient





- Social History


Smoking Status: Never Smoker


Frequency of alcohol use: None


Drug Abuse: None


Family History: Reviewed & Not Pertinent


Patient has suicidal ideation: No


Patient has homicidal ideation: No


Pulmonary Medical History: Reports: Hx Bronchitis


Neurological Medical History: Reports: Hx Migraine


Renal/ Medical History: Denies: Hx Peritoneal Dialysis


Psychiatric Medical History: Reports: Hx Depression





- Immunizations


Hx Diphtheria, Pertussis, Tetanus Vaccination: Yes





Vertical Provider Document





- CONSTITUTIONAL


Notes: 





PHYSICAL EXAMINATION:





GENERAL: Well-appearing, well-nourished and in no acute distress.





HEAD: Atraumatic, normocephalic.





EYES: Pupils equal round and reactive to light, extraocular movements intact, 

conjunctiva are normal.





ENT: Nares patent, oropharynx clear without exudates.  Moist mucous membranes.





NECK: Normal range of motion, supple without lymphadenopathy





LUNGS: Breath sounds clear to auscultation bilaterally and equal.  No wheezes 

rales or rhonchi.





HEART: Regular rate and rhythm without murmurs





ABDOMEN: Soft, nontender, nondistended abdomen.  No guarding, no rebound.  No 

masses appreciated.





Female : deferred





Musculoskeletal: Normal range of motion, no pitting or edema.  No cyanosis.  

Tenderness to palpation to right lumbar paraspinous area, no vertebral 

tenderness, step-off or deformity.





NEUROLOGICAL: Cranial nerves grossly intact.  Normal speech, normal gait.  

Normal sensory, motor exams





PSYCH: Normal mood, normal affect.





SKIN: Warm, Dry, normal turgor, no rashes or lesions noted.





- INFECTION CONTROL


TRAVEL OUTSIDE OF THE [place] IN LAST 30 DAYS: No





Course





- Re-evaluation


Re-evalutation: 





Patient appears well, nontoxic, vital signs within normal limits.  Physical 

examination most consistent with sciatica.  No indication for further work-up at

this time.  Patient is pregnant so she will be prescribed hydrocodone.  

Unfortunately all muscle relaxers are contraindicated in pregnancy so I cannot 

prescribe this for her.  Encourage patient to do conservative treatment at home 

and follow-up with her OB/GYN.  Patient and spouse at bedside verbalized 

understanding and agreement with this plan.











- Vital Signs


Vital signs: 


                                        











Temp Pulse Resp BP Pulse Ox


 


 97.2 F   86   18   117/66   98 


 


 12/15/19 12:30  12/15/19 12:30  12/15/19 12:30  12/15/19 12:30  12/15/19 12:30














Discharge





- Discharge


Clinical Impression: 


Sciatica


Qualifiers:


 Laterality: right Qualified Code(s): M54.31 - Sciatica, right side





Condition: Stable


Disposition: HOME, SELF-CARE


Additional Instructions: 


Sciatica





     Your symptoms suggest "sciatica."  The pain of sciatica typically radiates 

down the leg.  Numbness in the foot or calf may also occur. Sciatica is caused 

by irritation of the sciatic nerve or its branches. The irritation can be due to

a herniated disk in the spine, swelling and inflammation in the muscles 

surrounding the sciatic nerve, or direct injury of the nerve itself.


     Most cases of sciatica will resolve with medical treatment. Bed rest is 

usually recommended initially.  Surgery is only necessary when the condition 

will not improve with rest and antiinflammatory medication.  Muscle relaxers are

often given if muscle soreness is present.


     A CAT scan of the back may be performed if a herniated disk is suspected.


     Re-examination is necessary if you develop increasing numbness, localized 

weakness in the foot or ankle, or if the pain does not respond to rest.





Please take medications as prescribed.  You may take 1 to 2 tablets of the 

hydrocodone every 4 hours.  Please call your OB/GYN to schedule a follow-up 

appointment.  Let them know you are seen here and diagnosed with sciatica.  

Return to the emergency department with any new or worsening symptoms as 

outlined above.





Prescriptions: 


Hydrocodone Bit/Acetaminophen [Hydrocodon-Acetaminophen 5-325] 1 each PO Q4H #12

tablet


Referrals: 


ROQUE STOVALL MD [Primary Care Provider] - Follow up as needed

## 2020-01-17 ENCOUNTER — HOSPITAL ENCOUNTER (EMERGENCY)
Dept: HOSPITAL 62 - ER | Age: 30
Discharge: HOME | End: 2020-01-17
Payer: MEDICAID

## 2020-01-17 VITALS — SYSTOLIC BLOOD PRESSURE: 132 MMHG | DIASTOLIC BLOOD PRESSURE: 56 MMHG

## 2020-01-17 DIAGNOSIS — J11.1: ICD-10-CM

## 2020-01-17 DIAGNOSIS — Z3A.24: ICD-10-CM

## 2020-01-17 DIAGNOSIS — R51: ICD-10-CM

## 2020-01-17 DIAGNOSIS — O98.512: Primary | ICD-10-CM

## 2020-01-17 LAB
ANION GAP SERPL CALC-SCNC: 10 MMOL/L (ref 5–19)
BUN SERPL-MCNC: 4 MG/DL (ref 7–20)
CALCIUM: 8.4 MG/DL (ref 8.4–10.2)
CHLORIDE SERPL-SCNC: 104 MMOL/L (ref 98–107)
CO2 SERPL-SCNC: 20 MMOL/L (ref 22–30)
GLUCOSE SERPL-MCNC: 79 MG/DL (ref 75–110)
POTASSIUM SERPL-SCNC: 3.5 MMOL/L (ref 3.6–5)

## 2020-01-17 PROCEDURE — 84703 CHORIONIC GONADOTROPIN ASSAY: CPT

## 2020-01-17 PROCEDURE — 96375 TX/PRO/DX INJ NEW DRUG ADDON: CPT

## 2020-01-17 PROCEDURE — 80048 BASIC METABOLIC PNL TOTAL CA: CPT

## 2020-01-17 PROCEDURE — 86710 INFLUENZA VIRUS ANTIBODY: CPT

## 2020-01-17 PROCEDURE — 96374 THER/PROPH/DIAG INJ IV PUSH: CPT

## 2020-01-17 PROCEDURE — 99284 EMERGENCY DEPT VISIT MOD MDM: CPT

## 2020-01-17 PROCEDURE — 87070 CULTURE OTHR SPECIMN AEROBIC: CPT

## 2020-01-17 PROCEDURE — 96361 HYDRATE IV INFUSION ADD-ON: CPT

## 2020-01-17 PROCEDURE — 36415 COLL VENOUS BLD VENIPUNCTURE: CPT

## 2020-01-17 PROCEDURE — 87880 STREP A ASSAY W/OPTIC: CPT

## 2020-01-17 NOTE — ER DOCUMENT REPORT
ED General





- General


Chief Complaint: Headache


Stated Complaint: HEADACHE


Time Seen by Provider: 01/17/20 15:38


Primary Care Provider: 


ROQUE STOVALL MD [Primary Care Provider] - Follow up as needed


Notes: 





Patient is a 29-year-old female who comes in with sore throat and headache.  

Unsure fever at home.  Took Tylenol prior to arrival.  Patient is 24 weeks 

pregnant.  No abdominal pain, cramping, dysuria, or bleeding.  Patient's 

followed up with [facility].


TRAVEL OUTSIDE OF THE [place] IN LAST 30 DAYS: No





- Related Data


Allergies/Adverse Reactions: 


                                        





pineapple [Pineapple] Allergy (Verified 01/17/20 15:36)


   











Past Medical History





- Social History


Smoking Status: Never Smoker


Family History: Reviewed & Not Pertinent


Patient has suicidal ideation: No


Patient has homicidal ideation: No


Pulmonary Medical History: Reports: Hx Bronchitis


Neurological Medical History: Reports: Hx Migraine


Renal/ Medical History: Denies: Hx Peritoneal Dialysis


Psychiatric Medical History: Reports: Hx Depression





- Immunizations


Hx Diphtheria, Pertussis, Tetanus Vaccination: Yes





Review of Systems





- Review of Systems


-: Yes All other systems reviewed and negative





Physical Exam





- Vital signs


Vitals: 


                                        











Temp Pulse Resp BP Pulse Ox


 


 97.6 F   111 H  18   140/68 H  98 


 


 01/17/20 15:24  01/17/20 15:24  01/17/20 15:24  01/17/20 15:24  01/17/20 15:24











Interpretation: Normal





- General


General appearance: Alert


Notes: 





Appears uncomfortable





- HEENT


Head: Normocephalic, Atraumatic


Eyes: Normal


Pupils: PERRL


Mucous membranes: Dry





- Respiratory


Respiratory status: No respiratory distress


Chest status: Nontender


Breath sounds: Normal


Chest palpation: Normal





- Cardiovascular


Rhythm: Regular


Heart sounds: Normal auscultation


Murmur: No





- Abdominal


Inspection: Normal, Gravid female


Distension: No distension


Bowel sounds: Normal


Tenderness: Nontender


Organomegaly: No organomegaly





- Back


Back: Normal, Nontender





- Extremities


General upper extremity: Normal inspection, Nontender, Normal color, Normal ROM,

 Normal temperature


General lower extremity: Normal inspection, Nontender, Normal color, Normal ROM,

 Normal temperature, Normal weight bearing.  No: Catracho's sign





- Neurological


Neuro grossly intact: Yes


Cognition: Normal


Orientation: AAOx4


[place] Coma Scale Eye Opening: Spontaneous


Jovon Coma Scale Verbal: Oriented


Jovon Coma Scale Motor: Obeys Commands


[place] Coma Scale Total: 15


Speech: Normal


Motor strength normal: LUE, RUE, LLE, RLE


Sensory: Normal





- Psychological


Associated symptoms: Normal affect, Normal mood





- Skin


Skin Temperature: Warm


Skin Moisture: Dry


Skin Color: Normal





Course





- Re-evaluation


Re-evalutation: 





01/17/20 22:27


Patient feels better after medications for her headache.  No fever here.  Rapid 

strep negative.  On the likely strep and more likely variant of influenza.  

Patient does not want Tamiflu.  Blood pressure downtrending after headache was 

treated.  No evidence for preeclampsia and given that patient is 24 weeks 

pregnant, this is unlikely.  Fetal heart tones 160.  No evidence for threatened 

miscarriage.  Follow-up with OB/GYN and return if worsening concerning symptoms.

  Understands and agrees with plan.  Stable for discharge.





- Vital Signs


Vital signs: 


                                        











Temp Pulse Resp BP Pulse Ox


 


 98.3 F   94   18   132/56 H  98 


 


 01/17/20 22:21  01/17/20 22:21  01/17/20 22:21  01/17/20 22:21  01/17/20 22:21














- Laboratory


Result Diagrams: 


                                 01/17/20 18:52


Laboratory results interpreted by me: 


                                        











  01/17/20 01/17/20





  18:52 18:52


 


Sodium   133.6 L


 


Potassium   3.5 L


 


Carbon Dioxide   20 L


 


BUN   4 L


 


Serum HCG, Qual  POSITIVE H 














Discharge





- Discharge


Clinical Impression: 


 Flu-like symptoms





Pregnancy


Qualifiers:


 Weeks of gestation: 24 weeks Qualified Code(s): Z3A.24 - 24 weeks gestation of 

pregnancy





Headache


Qualifiers:


 Headache type: unspecified Headache chronicity pattern: acute headache 

Intractability: not intractable Qualified Code(s): R51 - Headache





Condition: Stable


Disposition: HOME, SELF-CARE


Instructions:  Headache ([facility]), Viral Syndrome ([facility])


Prescriptions: 


Ondansetron [Zofran Odt 4 mg Tablet] 1 tab PO Q6HP PRN #15 tab.rapdis


 PRN Reason: For Nausea/Vomiting


Metoclopramide HCl [Reglan 10 mg Tablet] 1 - 2 tab PO ASDIR PRN #25 tablet


 PRN Reason: 


Referrals: 


ROQUE STOVALL MD [Primary Care Provider] - 01/21/20

## 2020-01-21 LAB — FLUAV AB TITR SER CF: (no result) {TITER}

## 2020-01-22 LAB — FLUBV AB TITR SER CF: (no result) {TITER}

## 2020-04-13 LAB
ADD MANUAL DIFF: NO
APPEARANCE UR: (no result)
APTT PPP: YELLOW S
BARBITURATES UR QL SCN: NEGATIVE
BASOPHILS # BLD AUTO: 0 10^3/UL (ref 0–0.2)
BASOPHILS NFR BLD AUTO: 0.2 % (ref 0–2)
BILIRUB UR QL STRIP: NEGATIVE
EOSINOPHIL # BLD AUTO: 0.1 10^3/UL (ref 0–0.6)
EOSINOPHIL NFR BLD AUTO: 1.2 % (ref 0–6)
ERYTHROCYTE [DISTWIDTH] IN BLOOD BY AUTOMATED COUNT: 15.4 % (ref 11.5–14)
GLUCOSE UR STRIP-MCNC: NEGATIVE MG/DL
HCT VFR BLD CALC: 34.8 % (ref 36–47)
HGB BLD-MCNC: 11.6 G/DL (ref 12–15.5)
KETONES UR STRIP-MCNC: NEGATIVE MG/DL
LYMPHOCYTES # BLD AUTO: 1.5 10^3/UL (ref 0.5–4.7)
LYMPHOCYTES NFR BLD AUTO: 27.1 % (ref 13–45)
MCH RBC QN AUTO: 27.2 PG (ref 27–33.4)
MCHC RBC AUTO-ENTMCNC: 33.4 G/DL (ref 32–36)
MCV RBC AUTO: 81 FL (ref 80–97)
METHADONE UR QL SCN: NEGATIVE
MONOCYTES # BLD AUTO: 0.7 10^3/UL (ref 0.1–1.4)
MONOCYTES NFR BLD AUTO: 12 % (ref 3–13)
NEUTROPHILS # BLD AUTO: 3.3 10^3/UL (ref 1.7–8.2)
NEUTS SEG NFR BLD AUTO: 59.5 % (ref 42–78)
NITRITE UR QL STRIP: NEGATIVE
PCP UR QL SCN: NEGATIVE
PH UR STRIP: 6 [PH] (ref 5–9)
PLATELET # BLD: 95 10^3/UL (ref 150–450)
PROT UR STRIP-MCNC: NEGATIVE MG/DL
RBC # BLD AUTO: 4.28 10^6/UL (ref 3.72–5.28)
SP GR UR STRIP: 1.02
TOTAL CELLS COUNTED % (AUTO): 100 %
URINE AMPHETAMINES SCREEN: NEGATIVE
URINE BENZODIAZEPINES SCREEN: NEGATIVE
URINE COCAINE SCREEN: NEGATIVE
URINE MARIJUANA (THC) SCREEN: NEGATIVE
UROBILINOGEN UR-MCNC: NEGATIVE MG/DL (ref ?–2)
WBC # BLD AUTO: 5.5 10^3/UL (ref 4–10.5)

## 2020-04-17 ENCOUNTER — HOSPITAL ENCOUNTER (INPATIENT)
Dept: HOSPITAL 62 - 2S | Age: 30
LOS: 2 days | Discharge: HOME | End: 2020-04-19
Attending: OBSTETRICS & GYNECOLOGY | Admitting: OBSTETRICS & GYNECOLOGY
Payer: MEDICAID

## 2020-04-17 DIAGNOSIS — Z3A.37: ICD-10-CM

## 2020-04-17 DIAGNOSIS — O31.8X32: ICD-10-CM

## 2020-04-17 DIAGNOSIS — O30.043: ICD-10-CM

## 2020-04-17 DIAGNOSIS — D69.6: ICD-10-CM

## 2020-04-17 DIAGNOSIS — O31.8X31: Primary | ICD-10-CM

## 2020-04-17 DIAGNOSIS — O32.8XX1: ICD-10-CM

## 2020-04-17 DIAGNOSIS — O32.8XX2: ICD-10-CM

## 2020-04-17 PROCEDURE — 86901 BLOOD TYPING SEROLOGIC RH(D): CPT

## 2020-04-17 PROCEDURE — 86850 RBC ANTIBODY SCREEN: CPT

## 2020-04-17 PROCEDURE — 85025 COMPLETE CBC W/AUTO DIFF WBC: CPT

## 2020-04-17 PROCEDURE — 88307 TISSUE EXAM BY PATHOLOGIST: CPT

## 2020-04-17 PROCEDURE — 81001 URINALYSIS AUTO W/SCOPE: CPT

## 2020-04-17 PROCEDURE — 94760 N-INVAS EAR/PLS OXIMETRY 1: CPT

## 2020-04-17 PROCEDURE — 86900 BLOOD TYPING SEROLOGIC ABO: CPT

## 2020-04-17 PROCEDURE — 36415 COLL VENOUS BLD VENIPUNCTURE: CPT

## 2020-04-17 PROCEDURE — 85027 COMPLETE CBC AUTOMATED: CPT

## 2020-04-17 PROCEDURE — 80307 DRUG TEST PRSMV CHEM ANLYZR: CPT

## 2020-04-17 PROCEDURE — 59025 FETAL NON-STRESS TEST: CPT

## 2020-04-17 PROCEDURE — 94799 UNLISTED PULMONARY SVC/PX: CPT

## 2020-04-17 RX ADMIN — OXYCODONE AND ACETAMINOPHEN PRN TAB: 5; 325 TABLET ORAL at 21:05

## 2020-04-17 RX ADMIN — DOCUSATE SODIUM SCH MG: 100 CAPSULE, LIQUID FILLED ORAL at 17:26

## 2020-04-17 RX ADMIN — MORPHINE SULFATE PRN MG: 10 INJECTION INTRAMUSCULAR; INTRAVENOUS; SUBCUTANEOUS at 10:19

## 2020-04-17 RX ADMIN — MORPHINE SULFATE PRN MG: 10 INJECTION INTRAMUSCULAR; INTRAVENOUS; SUBCUTANEOUS at 13:40

## 2020-04-17 RX ADMIN — OXYCODONE AND ACETAMINOPHEN PRN TAB: 5; 325 TABLET ORAL at 16:01

## 2020-04-17 RX ADMIN — KETOROLAC TROMETHAMINE SCH MG: 30 INJECTION, SOLUTION INTRAMUSCULAR at 13:40

## 2020-04-17 RX ADMIN — MORPHINE SULFATE PRN MG: 10 INJECTION INTRAMUSCULAR; INTRAVENOUS; SUBCUTANEOUS at 10:44

## 2020-04-17 RX ADMIN — Medication SCH: at 11:46

## 2020-04-17 RX ADMIN — OXYCODONE AND ACETAMINOPHEN PRN TAB: 5; 325 TABLET ORAL at 11:44

## 2020-04-17 RX ADMIN — SODIUM CHLORIDE, SODIUM LACTATE, POTASSIUM CHLORIDE, AND CALCIUM CHLORIDE PRN MLS/HR: .6; .31; .03; .02 INJECTION, SOLUTION INTRAVENOUS at 07:02

## 2020-04-17 RX ADMIN — MORPHINE SULFATE PRN MG: 10 INJECTION INTRAMUSCULAR; INTRAVENOUS; SUBCUTANEOUS at 10:32

## 2020-04-17 RX ADMIN — SODIUM CHLORIDE, SODIUM LACTATE, POTASSIUM CHLORIDE, AND CALCIUM CHLORIDE PRN MLS/HR: .6; .31; .03; .02 INJECTION, SOLUTION INTRAVENOUS at 16:00

## 2020-04-17 RX ADMIN — KETOROLAC TROMETHAMINE SCH MG: 30 INJECTION, SOLUTION INTRAMUSCULAR at 21:05

## 2020-04-17 RX ADMIN — DOCUSATE SODIUM SCH: 100 CAPSULE, LIQUID FILLED ORAL at 11:45

## 2020-04-17 NOTE — OPERATIVE REPORT
Operative Report


DATE OF SURGERY: 20


PREOPERATIVE DIAGNOSIS: IUP @ 37 wks, di di twin gestation, breech presentation 

of fetus a and b


POSTOPERATIVE DIAGNOSIS: same


OPERATION: Primary low transverse hysterotomy  section


SURGEON: SOULEYMANE WOODARD


ANESTHESIA: Spinal


TISSUE REMOVED OR ALTERED: Placentas with umbilical clamp on placenta B


COMPLICATIONS: 





None


ESTIMATED BLOOD LOSS: 800 cc


INTRAOPERATIVE FINDINGS: Infant A female in vilma breech presentation Apgars of 

8 and 9.  infant B female in footling breech presentation with Apgars of 8 and 9


PROCEDURE: 








PROCEDURE IN DETAIL:


The patient was taken to the operating room, prepared and draped in a


normal sterile fashion in a supine position with a leftward tilt. A


transverse skin incision was made with a scalpel and carried through to


the underlying layer of fascia with the same scalpel. The fascia was


excised in the midline and extended laterally with Bridgett. The fascia was


then dissected from the rectus muscle sharply with Bridgett and the rectus


muscle was divided and the peritoneal cavity was entered sharply with the


same Metzenbaum. With good visualization of the bladder and the uterus


the bladder blade was inserted. The hysterotomy was nicked with a scalpel


and extended laterally with surgeon finger fraction. The infant A was then


delivered atraumatically using normal breech extraction maneuvers .the nose and 

mouth were suctioned with a


suction bulb, the cord was clamped and cut and handed off to awaiting


pediatricians. Cord blood was collected.  Amniotomy was then performed on infant

B and the feet were located and grasped and the infant was removed using normal 

breech extraction maneuvers.  Cord blood was collected .  the placentas were 

removed


manually. The uterus was exteriorized and cleared of clots and debris.


The hysterotomy was closed with 0 Monocryl in a running, locked fashion.


A second layer of the same suture was used to imbricate to ensure


hemostasis. The uterus was returned to the abdomen and peritoneal cavity


was cleared of clots and debris. The rectus muscle and peritoneum were


repaired with mattress stitch of 2-0 Chromic. The fascia was closed with


0-Vicryl. The subcutaneous layer was closed with plain catgut and the


skin was closed with 4-0 Vicryl. The patient tolerated the procedure


well. Sponge, lap, and needle counts correct x2 and the patient was taken


to recovery in stable condition.

## 2020-04-18 LAB
ERYTHROCYTE [DISTWIDTH] IN BLOOD BY AUTOMATED COUNT: 15.6 % (ref 11.5–14)
HCT VFR BLD CALC: 31 % (ref 36–47)
HGB BLD-MCNC: 10.4 G/DL (ref 12–15.5)
MCH RBC QN AUTO: 27.5 PG (ref 27–33.4)
MCHC RBC AUTO-ENTMCNC: 33.7 G/DL (ref 32–36)
MCV RBC AUTO: 82 FL (ref 80–97)
PLATELET # BLD: 90 10^3/UL (ref 150–450)
RBC # BLD AUTO: 3.8 10^6/UL (ref 3.72–5.28)
WBC # BLD AUTO: 9.2 10^3/UL (ref 4–10.5)

## 2020-04-18 RX ADMIN — OXYCODONE AND ACETAMINOPHEN PRN TAB: 5; 325 TABLET ORAL at 05:23

## 2020-04-18 RX ADMIN — Medication SCH CAP: at 10:12

## 2020-04-18 RX ADMIN — DOCUSATE SODIUM SCH MG: 100 CAPSULE, LIQUID FILLED ORAL at 18:14

## 2020-04-18 RX ADMIN — OXYCODONE AND ACETAMINOPHEN PRN TAB: 5; 325 TABLET ORAL at 22:07

## 2020-04-18 RX ADMIN — KETOROLAC TROMETHAMINE SCH MG: 30 INJECTION, SOLUTION INTRAMUSCULAR at 05:22

## 2020-04-18 RX ADMIN — DOCUSATE SODIUM SCH MG: 100 CAPSULE, LIQUID FILLED ORAL at 10:12

## 2020-04-18 RX ADMIN — KETOROLAC TROMETHAMINE SCH: 30 INJECTION, SOLUTION INTRAMUSCULAR at 14:07

## 2020-04-18 RX ADMIN — OXYCODONE AND ACETAMINOPHEN PRN TAB: 5; 325 TABLET ORAL at 10:55

## 2020-04-18 RX ADMIN — OXYCODONE AND ACETAMINOPHEN PRN TAB: 5; 325 TABLET ORAL at 01:07

## 2020-04-18 NOTE — PDOC PROGRESS REPORT
Subjective-OB


Progress Note for:: 20


Subjective: 





Pt doing well, no concerns. SHe has been out of bed, voiding and passing gas, 

reg diet. Pain well controlled.





Physical Exam (OB)


Vital Signs: 


                                        











Temp Pulse Resp BP Pulse Ox


 


 97.5 F   87   18   129/57 H  100 


 


 20 11:08  20 11:08  20 11:08  20 11:08  20 11:08








                                 Intake & Output











 20





 06:59 06:59 06:59


 


Intake Total  3197 


 


Output Total  1200 350


 


Balance  1997


 


Weight 121.8 kg  














- PIH/Pre-Eclampsia


DTR's: 1 +


Clonus: Negative


Headache: Present


Epigastric Pain: No


Visual Changes: No





- 


Dressing Removed: No


Incision: Dressing


Closure Type: OPSITE





- Lochia


Lochia Amount: Small 10-25 ml


Lochia Color: Rubra/Red





- Abdomen


Description: Tender, Soft


Hernia Present: No


Fundal Description: Firm


Fundal Height: u/u - u/2





Objective-Diagnostic


Laboratory: 


                                        





                                 20 07:03 





                                        











  20





  07:03


 


WBC  9.2


 


RBC  3.80


 


Hgb  10.4 L


 


Hct  31.0 L


 


MCV  82


 


MCH  27.5


 


MCHC  33.7


 


RDW  15.6 H


 


Plt Count  90 L














Assessment and Plan(PN)





- Assessment and Plan


(1) Delivery of twins, both live


Is this a current diagnosis for this admission?: Yes   





(2) S/P primary low transverse 


Is this a current diagnosis for this admission?: Yes   





(3) Thrombocytopenia affecting pregnancy


Is this a current diagnosis for this admission?: Yes   





- Time Spent with Patient


Time with patient: Less than 15 minutes


Medications reviewed and adjusted accordingly: Yes





- Disposition


Anticipated Discharge: Home


Within: within 24 hours

## 2020-04-19 VITALS — SYSTOLIC BLOOD PRESSURE: 116 MMHG | DIASTOLIC BLOOD PRESSURE: 65 MMHG

## 2020-04-19 RX ADMIN — ACETAMINOPHEN PRN MG: 325 TABLET ORAL at 06:14

## 2020-04-19 RX ADMIN — DOCUSATE SODIUM SCH MG: 100 CAPSULE, LIQUID FILLED ORAL at 09:52

## 2020-04-19 RX ADMIN — Medication SCH CAP: at 09:52

## 2020-04-19 RX ADMIN — ACETAMINOPHEN PRN MG: 325 TABLET ORAL at 09:54

## 2020-04-19 NOTE — PDOC DISCHARGE SUMMARY
Impression





- Admit/DC Date/PCP


Admission Date/Primary Care Provider: 


  20 04:35





  SOULEYMANE WOODARD MD





Discharge Date: 20





- Discharge Diagnosis


(1) Delivery of twins, both live


Is this a current diagnosis for this admission?: Yes   





(2) S/P primary low transverse 


Is this a current diagnosis for this admission?: Yes   





(3) Thrombocytopenia affecting pregnancy


Is this a current diagnosis for this admission?: Yes   





- Additional Information


Resuscitation Status: Full Code


Discharge Diet: Regular


Discharge Activity: Balance Activity w/Rest, Pelvic Rest


Referrals: 


SOULEYMANE WOODARD MD [Primary Care Provider] - 


Prescriptions: 


Oxycodone HCl/Acetaminophen [Percocet 5-325 mg Tablet] 1 tab PO Q4HP PRN #30 

tablet


 PRN Reason: For Pain Scale 3-5


Ibuprofen [Motrin 800 mg Tablet] 800 mg PO Q8HP PRN #60 tablet


 PRN Reason: For Pain Scale 1-3


Home Medications: 








Prenatal Vit/Dha [Prenatal Multi + Dha Capsule] 1 cap PO DAILY #90 capsule 

19 


Ibuprofen [Motrin 800 mg Tablet] 800 mg PO Q8HP PRN #60 tablet 20 


Oxycodone HCl/Acetaminophen [Percocet 5-325 mg Tablet] 1 tab PO Q4HP PRN #30 

tablet 20 











Results


Laboratory Results: 


                                        











WBC  9.2 10^3/uL (4.0-10.5)   20  07:03    


 


RBC  3.80 10^6/uL (3.72-5.28)   20  07:03    


 


Hgb  10.4 g/dL (12.0-15.5)  L  20  07:03    


 


Hct  31.0 % (36.0-47.0)  L  20  07:03    


 


MCV  82 fl (80-97)   20  07:03    


 


MCH  27.5 pg (27.0-33.4)   20  07:03    


 


MCHC  33.7 g/dL (32.0-36.0)   20  07:03    


 


RDW  15.6 % (11.5-14.0)  H  20  07:03    


 


Plt Count  90 10^3/uL (150-450)  L  20  07:03    


 


Lymph % (Auto)  27.1 % (13-45)   20  08:40    


 


Mono % (Auto)  12.0 % (3-13)   20  08:40    


 


Eos % (Auto)  1.2 % (0-6)   20  08:40    


 


Baso % (Auto)  0.2 % (0-2)   20  08:40    


 


Absolute Neuts (auto)  3.3 10^3/uL (1.7-8.2)   20  08:40    


 


Absolute Lymphs (auto)  1.5 10^3/uL (0.5-4.7)   20  08:40    


 


Absolute Monos (auto)  0.7 10^3/uL (0.1-1.4)   20  08:40    


 


Absolute Eos (auto)  0.1 10^3/uL (0.0-0.6)   20  08:40    


 


Absolute Basos (auto)  0.0 10^3/uL (0.0-0.2)   20  08:40    


 


Seg Neutrophils %  59.5 % (42-78)   20  08:40    


 


Urine Color  YELLOW   20  08:40    


 


Urine Appearance  CLOUDY   20  08:40    


 


Urine pH  6.0  (5.0-9.0)   20  08:40    


 


Ur Specific Gravity  1.019   20  08:40    


 


Urine Protein  NEGATIVE mg/dL (NEGATIVE)   20  08:40    


 


Urine Glucose (UA)  NEGATIVE mg/dL (NEGATIVE)   20  08:40    


 


Urine Ketones  NEGATIVE mg/dL (NEGATIVE)   20  08:40    


 


Urine Blood  NEGATIVE  (NEGATIVE)   20  08:40    


 


Urine Nitrite  NEGATIVE  (NEGATIVE)   20  08:40    


 


Urine Bilirubin  NEGATIVE  (NEGATIVE)   20  08:40    


 


Urine Urobilinogen  NEGATIVE mg/dL (<2.0)   20  08:40    


 


Ur Leukocyte Esterase  MODERATE  (NEGATIVE)  H  20  08:40    


 


Urine WBC (Auto)  10 /HPF  20  08:40    


 


Urine RBC (Auto)  1 /HPF  20  08:40    


 


Urine Bacteria (Auto)  1+ /HPF  20  08:40    


 


Squamous Epi Cells Auto  52 /HPF  20  08:40    


 


Urine Mucus (Auto)  FEW /LPF  20  08:40    


 


Urine Ascorbic Acid  NEGATIVE  (NEGATIVE)   20  08:40    


 


Urine Opiates Screen  NEGATIVE   20  08:40    


 


Urine Methadone Screen  NEGATIVE   20  08:40    


 


Ur Barbiturates Screen  NEGATIVE   20  08:40    


 


Ur Phencyclidine Scrn  NEGATIVE   20  08:40    


 


Ur Amphetamines Screen  NEGATIVE   20  08:40    


 


U Benzodiazepines Scrn  NEGATIVE   20  08:40    


 


Urine Cocaine Screen  NEGATIVE   20  08:40    


 


U Marijuana (THC) Screen  NEGATIVE   20  08:40    


 


Blood Type  O POSITIVE   20  10:36    


 


Antibody Screen  NEGATIVE   20  10:36    














Plan


Plan of Treatment: 


f/u at [facility] as scheduled


Time Spent: Less than 30 Minutes

## 2020-08-16 ENCOUNTER — HOSPITAL ENCOUNTER (EMERGENCY)
Dept: HOSPITAL 62 - ER | Age: 30
Discharge: HOME | End: 2020-08-16
Payer: MEDICAID

## 2020-08-16 VITALS — SYSTOLIC BLOOD PRESSURE: 134 MMHG | DIASTOLIC BLOOD PRESSURE: 80 MMHG

## 2020-08-16 DIAGNOSIS — M25.511: Primary | ICD-10-CM

## 2020-08-16 PROCEDURE — 99282 EMERGENCY DEPT VISIT SF MDM: CPT

## 2020-08-16 NOTE — ER DOCUMENT REPORT
ED Extremity Problem, Upper





- General


Chief Complaint: Shoulder Pain


Stated Complaint: BACK PAIN


Time Seen by Provider: 20 20:09


Primary Care Provider: 


SOULEYMANE WOODARD MD [Primary Care Provider] - Follow up as needed


Mode of Arrival: Ambulatory


Notes: 








30-year-old female presents to ED for complaint of right shoulder pain.  She 

states she has had this pain in the past.  She states this time is been for 

about 2 weeks.  She states last week it got much worse.  She does have a set of 

twins that are 4 months old that she does  frequently.  She also has a 

1-year-old.  She states past medical history is a .  She states she 

does not smoke drink or use any illicit drugs.  Will give ibuprofen now and 

write prescription for some Flexeril.  She has full range of motion to the 

shoulder but it is painful.  She states she has not had any injuries but she is 

picking up her twins.  She has been instructed to follow-up with her primary 

care and get a referral to orthopedics.  I have given her the name and number of

an orthopedic but she will need to get the referral from her primary care.











REVIEW OF SYSTEMS:


CONSTITUTIONAL :  Denies fever,  chills, or sweats.  Denies recent illness.


EENT:   Denies eye, ear, throat, or mouth pain or symptoms.  Denies nasal or 

sinus congestion.


CARDIOVASCULAR:  Denies chest pain.


RESPIRATORY:  Denies cough, cold, or chest congestion.  Denies shortness of 

breath, difficulty breathing, or wheezing.


GASTROINTESTINAL:  Denies abdominal pain.  Denies nausea, vomiting, or diarrhea.

 Denies constipation.  Last BM: 


GENITOURINARY:  Denies difficulty urinating, painful urination, burning, 

frequency, or blood in urine.


FEMALE  GENITOURINARY:  Denies vaginal bleeding, abnormal or irregular periods. 

LMP:


MUSCULOSKELETAL: Left shoulder pain pain with range of motion


SKIN:   Denies rash or skin lesions.


HEMATOLOGIC :   Denies easy bruising or bleeding.


LYMPHATIC:  Denies swollen, enlarged glands.


NEUROLOGICAL:  Denies altered mental status or loss of consciousness.  Denies 

headache.  Denies weakness or paralysis or loss of use of either side.  Denies 

problems with gait or speech.  Denies sensory or motor loss.


PSYCHIATRIC:  Denies anxiety or stress or depression.


ALL OTHER SYSTEMS REVIEWED AND NEGATIVE.














VITAL SIGNS: Within normal limits.


GENERAL:  No acute distress, non-toxic appearance.  


HEAD:  Normal with no signs of head trauma.


EYES:  PERRLA, EOMI, conjunctiva normal, no discharge.  


EARS:  Hearing grossly intact.


NOSE: Normal.


THROAT:  Oropharynx is normal. 


NECK:  Normal range of motion, no tenderness, supple, no lymphadenopathy, No 

adenopathy, no JVD.   


CHEST:  Clear breath sounds bilaterally.  No wheezes, rales, or rhonchi.  


CARDIAC:  Regular rate and rhythm.  S1 and S2, without murmurs, gallops, or r

ubs.


VASCULAR:  No Edema.  Peripheral pulses normal and equal in all extremities.


ABDOMEN: Normal and soft with no tenderness, no masses or pulsatile masses.


GASTROINTESTINAL: Bowel sounds normal


GENITOURINARY: Normal, No tenderness


LYMPATHTIC:  No lymphadenopathy noted.


MUSCULOSKELETAL: Tenderness to palpation and range of motion to the left 

shoulder.  She has tenderness to the upper trapezius left muscle.


NEUROLOGICAL:  Alert and oriented x 3.  No focal sensory or strength deficits.  

Speech normal.  Follows commands appropriately.


PSYCHIATRIC:  Normal Affect, judgement and mood.


SKIN:  Normal appearance with no rashes or lesions.


TRAVEL OUTSIDE OF THE [place] IN LAST 30 DAYS: No





- Related Data


Allergies/Adverse Reactions: 


                                        





pineapple [Pineapple] Allergy (Severe, Verified 20 08:52)


   hives,passing out











Past Medical History





- General


Information source: Patient





- Social History


Smoking Status: Never Smoker


Chew tobacco use (# tins/day): No


Frequency of alcohol use: None


Drug Abuse: None


Lives with: Family


Family History: Reviewed & Not Pertinent


Patient has homicidal ideation: No





- Past Medical History


Cardiac Medical History: Reports: None


Pulmonary Medical History: Reports: Hx Bronchitis


EENT Medical History: Reports: None


Neurological Medical History: Reports: Hx Migraine


Endocrine Medical History: Reports: None


Renal/ Medical History: Reports: Hx Ovarian Cysts


Malignancy Medical History: Reports: None


GI Medical History: Reports: None


Musculoskeletal Medical History: Reports Hx Musculoskeletal Trauma


Skin Medical History: Reports None


Psychiatric Medical History: Reports: None


Traumatic Medical History: Reports: None


Infectious Medical History: Reports: None


Past Surgical History: Reports: Hx  Section





- Immunizations


Hx Diphtheria, Pertussis, Tetanus Vaccination: Yes - 2019





Physical Exam





- Vital signs


Vitals: 


                                        











Temp Pulse Resp BP Pulse Ox


 


 98.0 F   81   20   134/80 H  100 


 


 20 19:02  20 19:02  20 19:02  20 19:02  20 19:02














Course





- Re-evaluation


Re-evalutation: 





20 22:22


Patient was treated with a sling per her request for her chronic shoulder pain. 

 She was also given instructions for shoulder exercises and to follow-up with 

orthopedics.  Patient was able to verbalize understanding and agreement with 

treatment plan and patient was discharged home.





- Vital Signs


Vital signs: 


                                        











Temp Pulse Resp BP Pulse Ox


 


 98.0 F   81   20   134/80 H  100 


 


 20 19:02  20 19:02  20 19:02  20 19:02  20 19:02














Procedures





- Immobilization


  ** Left Shoulder


Time completed: 20:25


Pre-Proc Neuro Vasc Exam: Normal


Immobilizer type: Sling


Performed by: PCT


Post-Proc Neuro Vasc Exam: Normal


Alignment checked and good: Yes





Discharge





- Discharge


Clinical Impression: 


Left shoulder pain


Qualifiers:


 Chronicity: chronic Qualified Code(s): M25.512 - Pain in left shoulder





Condition: Stable


Disposition: HOME, SELF-CARE


Additional Instructions: 


Shoulder Injury





     You have injured your shoulder.  This usually results from stretching or 

tearing of the tendons during trauma.  Time and protection are required in order

 to heal properly.  Many injuries are quite disabling, and should be taken 

seriously.


     Initial treatment includes cold packs and a sling to rest the shoulder.  

The physician has assessed the seriousness of your injury, and has outlined a 

treatment plan.  Understand that this treatment may change, depending on how you

 progress.


     If a re-examination was recommended, it is important that you follow up as 

instructed.  Some shoulder injuries (such as partial tear of the rotator cuff) 

are only suspected after you've failed to improve.


Call us if there's severe pain, numbness, or loss of function.








Exercise Program for the Shoulder





     Since the shoulder moves in so many directions, the joint attachment is 

weak.  Muscles provide most of the stability to the shoulder.  You must exercise

 your shoulder to prevent painful instability or stiffening.


     PASSIVE - These may be begun within a few days of the injury. While 

standing, lean forward, allowing the arm to hang down towards the floor.  Move 

the arm in small circles while slowly twisting your chest towards and away from 

the hanging arm.  Do this for one minute.


     ACTIVE - These may be performed when the doctor gives permission. Begin 

with the arms at the sides.  Raise the arms forward (shoulder's width apart) 

until they reach shoulder level.  Then slowly swing both arms back until they a

re aiming straight out away from each other. Then bring them forward again, and 

finally, lower them to your sides. Repeat 20 to 30 times.  As you improve, put 

weights in your hands for the exercise.  Start with one pound, and work up to 10

 pounds.  Never use more than is comfortable.  Athletes may work up to 30 

pounds.








I am given you a sling to wear only as necessary.  Do not wear it all the time. 

 You do need to exercise the shoulder as I have described.  Please follow-up 

with your primary doctor in the next couple days to get a referral to 

orthopedics if it continues to hurt.  Please do not take muscle relaxers when 

you are home with the twins by yourself.





Ibuprofen





     Ibuprofen is an excellent, safe drug for pain control.  In addition, it has

 potent antiinflammatory effects which are beneficial, especially in the 

treatment of injuries, arthritis, or tendonitis. It's best to take ibuprofen 

with food.  Persons with ulcer disease or allergy to aspirin should notify their

 physician of this before taking ibuprofen.


     Take the medication exactly as prescribed.  Don't take additional doses 

unless instructed to do so by your doctor.  If you develop wheezing, shortness 

of breath, hives, faintness, stomach pain, vomiting, or dark black stools, 

return for re-evaluation at once.





Ice Packs





     Apply ice packs frequently against the painful area.  Many different 

schedules are recommended, such as "20 minutes on, 20 minutes off" or "one hour 

ice, two hours rest."  If you need to work, you may need to go longer between 

ice treatments.  You should plan to have the area ice packed AT LEAST one fourth

 of the time.


     The ice should be applied over the wrap, tape, or splint, or over a layer 

of cloth -- not directly against the skin.  Some ice bags have a built-in cloth 

and can be put directly on the skin.





Warm Packs





     After approximately two days, apply gentle heat (such as a heating pad or 

hot water bottle) for about 20 to 30 minutes about every two hours -- at least 

four times daily.  Warmth and elevation will help you make a more rapid 

recovery, and will ease the pain considerably.


     Do not use HOT heat, and never apply heat for longer than 30 minutes.  The 

continuous heat can invisibly damage skin and muscles -- even when no burn is 

seen on the surface.  Damaged muscles can make you MORE sore.





FOLLOW-UP CARE:


If you have been referred to a physician for follow-up care, call the 

physicians office for an appointment as you were instructed or within the next 

two days.  If you experience worsening or a significant change in your symptoms,

 notify the physician immediately or return to the Emergency Department at any 

time for re-evaluation.


Prescriptions: 


Cyclobenzaprine HCl [Flexeril 10 mg Tablet] 10 mg PO TIDP PRN #15 tab


 PRN Reason: For Pain Scale 3-5


Forms:  Elevated Blood Pressure


Referrals: 


SOULEYMANE WOODARD MD [Primary Care Provider] - Follow up as needed

## 2020-08-16 NOTE — ER DOCUMENT REPORT
ED Extremity Problem, Lower





- General


Chief Complaint: Leg Pain


Stated Complaint: BACK PAIN


Time Seen by Provider: 20 20:09


Primary Care Provider: 


SOULEYMANE WOODARD MD [Primary Care Provider] - Follow up as needed


Mode of Arrival: Ambulatory


Information source: Patient


Notes: 





30-year-old female presents to ED for complaint of right shoulder pain.  She 

states she has had this pain in the past.  She states this time is been for 

about 2 weeks.  She states last week it got much worse.  She does have a set of 

twins that are 4 months old that she does  frequently.  She also has a 

1-year-old.  She states past medical history is a .  She states she 

does not smoke drink or use any illicit drugs.  Will give ibuprofen now and 

write prescription for some Flexeril.  She has full range of motion to the 

shoulder but it is painful.  She states she has not had any injuries but she is 

picking up her twins.  She has been instructed to follow-up with her primary 

care and get a referral to orthopedics.  I have given her the name and number of

an orthopedic but she will need to get the referral from her primary care.











REVIEW OF SYSTEMS:


CONSTITUTIONAL :  Denies fever,  chills, or sweats.  Denies recent illness.


EENT:   Denies eye, ear, throat, or mouth pain or symptoms.  Denies nasal or 

sinus congestion.


CARDIOVASCULAR:  Denies chest pain.


RESPIRATORY:  Denies cough, cold, or chest congestion.  Denies shortness of 

breath, difficulty breathing, or wheezing.


GASTROINTESTINAL:  Denies abdominal pain.  Denies nausea, vomiting, or diarrhea.

 Denies constipation.  Last BM: 


GENITOURINARY:  Denies difficulty urinating, painful urination, burning, 

frequency, or blood in urine.


FEMALE  GENITOURINARY:  Denies vaginal bleeding, abnormal or irregular periods. 

LMP:


MUSCULOSKELETAL: Right shoulder pain pain with range of motion


SKIN:   Denies rash or skin lesions.


HEMATOLOGIC :   Denies easy bruising or bleeding.


LYMPHATIC:  Denies swollen, enlarged glands.


NEUROLOGICAL:  Denies altered mental status or loss of consciousness.  Denies 

headache.  Denies weakness or paralysis or loss of use of either side.  Denies 

problems with gait or speech.  Denies sensory or motor loss.


PSYCHIATRIC:  Denies anxiety or stress or depression.


ALL OTHER SYSTEMS REVIEWED AND NEGATIVE.














VITAL SIGNS: Within normal limits.


GENERAL:  No acute distress, non-toxic appearance.  


HEAD:  Normal with no signs of head trauma.


EYES:  PERRLA, EOMI, conjunctiva normal, no discharge.  


EARS:  Hearing grossly intact.


NOSE: Normal.


THROAT:  Oropharynx is normal. 


NECK:  Normal range of motion, no tenderness, supple, no lymphadenopathy, No 

adenopathy, no JVD.   


CHEST:  Clear breath sounds bilaterally.  No wheezes, rales, or rhonchi.  


CARDIAC:  Regular rate and rhythm.  S1 and S2, without murmurs, gallops, or 

rubs.


VASCULAR:  No Edema.  Peripheral pulses normal and equal in all extremities.


ABDOMEN: Normal and soft with no tenderness, no masses or pulsatile masses.


GASTROINTESTINAL: Bowel sounds normal


GENITOURINARY: Normal, No tenderness


LYMPATHTIC:  No lymphadenopathy noted.


MUSCULOSKELETAL: Tenderness to palpation and range of motion to the right should

er.  She has tenderness to the upper trapezius right muscle.


NEUROLOGICAL:  Alert and oriented x 3.  No focal sensory or strength deficits.  

Speech normal.  Follows commands appropriately.


PSYCHIATRIC:  Normal Affect, judgement and mood.


SKIN:  Normal appearance with no rashes or lesions.


TRAVEL OUTSIDE OF THE [place] IN LAST 30 DAYS: No





- HPI


Patient complains to provider of: Pain





- Related Data


Allergies/Adverse Reactions: 


                                        





pineapple [Pineapple] Allergy (Severe, Verified 20 08:52)


   hives,passing out











Past Medical History





- Social History


Smoking Status: Never Smoker


Chew tobacco use (# tins/day): No


Frequency of alcohol use: None


Drug Abuse: None


Family History: Reviewed & Not Pertinent


Patient has homicidal ideation: No


Pulmonary Medical History: Reports: Hx Bronchitis


Neurological Medical History: Reports: Hx Migraine


Renal/ Medical History: Reports: Hx Ovarian Cysts.  Denies: Hx Peritoneal 

Dialysis


Psychiatric Medical History: Reports: Hx Depression





- Immunizations


Hx Diphtheria, Pertussis, Tetanus Vaccination: Yes





Physical Exam





- Vital signs


Vitals: 





                                        











Temp Pulse Resp BP Pulse Ox


 


 98.0 F   81   20   134/80 H  100 


 


 20 19:02  20 19:02  20 19:02  20 19:02  20 19:02














Course





- Vital Signs


Vital signs: 





                                        











Temp Pulse Resp BP Pulse Ox


 


 98.0 F   81   20   134/80 H  100 


 


 20 19:02  20 19:02  20 19:02  20 19:02  20 19:02














Discharge





- Discharge


Referrals: 


SOULEYMANE WOODARD MD [Primary Care Provider] - Follow up as needed

## 2020-11-22 ENCOUNTER — HOSPITAL ENCOUNTER (EMERGENCY)
Dept: HOSPITAL 62 - ER | Age: 30
Discharge: LEFT BEFORE DISCHARGE | End: 2020-11-22
Payer: MEDICAID

## 2020-11-22 VITALS — DIASTOLIC BLOOD PRESSURE: 67 MMHG | SYSTOLIC BLOOD PRESSURE: 131 MMHG

## 2020-11-22 DIAGNOSIS — R07.2: Primary | ICD-10-CM

## 2020-11-22 DIAGNOSIS — R06.02: ICD-10-CM

## 2020-11-22 DIAGNOSIS — M79.10: ICD-10-CM

## 2020-11-22 DIAGNOSIS — Z91.018: ICD-10-CM

## 2020-11-22 DIAGNOSIS — R07.1: ICD-10-CM

## 2020-11-22 DIAGNOSIS — R09.81: ICD-10-CM

## 2020-11-22 DIAGNOSIS — G43.909: ICD-10-CM

## 2020-11-22 DIAGNOSIS — Z53.20: ICD-10-CM

## 2020-11-22 PROCEDURE — 93005 ELECTROCARDIOGRAM TRACING: CPT

## 2020-11-22 PROCEDURE — 93010 ELECTROCARDIOGRAM REPORT: CPT

## 2020-11-22 PROCEDURE — 99281 EMR DPT VST MAYX REQ PHY/QHP: CPT

## 2020-11-22 NOTE — ER DOCUMENT REPORT
ED Medical Screen (RME)





- General


Chief Complaint: Chest Pain


Stated Complaint: CHEST PAIN


Time Seen by Provider: 20 13:14


Primary Care Provider: 


SOULEYMANE WOODARD MD [Primary Care Provider] - Follow up as needed


TRAVEL OUTSIDE OF THE [place] IN LAST 30 DAYS: No





- HPI


Notes: 





Patient is a 29 y/o female with no medical hx who presents with chest pain, 

shortness of breath and myalgias for the past three days. She states her chest 

pain is substernal and is present when she takes a deep breath.  She reports 

nasal congestion, but denies fever, cough, sore throat, abdominal pain and 

vomiting.  








- Related Data


Allergies/Adverse Reactions: 


                                        





pineapple [Pineapple] Allergy (Severe, Verified 20 13:10)


   hives,passing out








Home Medications: migraine medication as needed.





Past Medical History





- Social History


Chew tobacco use (# tins/day): No


Frequency of alcohol use: None


Drug Abuse: None


Family history: None


Pulmonary Medical History: Reports: Hx Bronchitis


Neurological Medical History: Reports: Hx Migraine


Renal/ Medical History: Reports: Hx Ovarian Cysts.  Denies: Hx Peritoneal 

Dialysis


Musculoskeltal Medical History: Reports Hx Musculoskeletal Trauma


Psychiatric Medical History: Reports: Hx Depression


Past Surgical History: Reports: Hx  Section





- Immunizations


Hx Diphtheria, Pertussis, Tetanus Vaccination: Yes - 2019





Physical Exam





- Vital signs


Vitals: 





                                        











Temp Pulse Resp BP Pulse Ox


 


 98.6 F   72   15   131/67 H  100 


 


 20 12:57  20 12:57  20 12:57  20 12:57  20 12:57














- Respiratory


Respiratory status: No respiratory distress


Breath sounds: Normal





- Cardiovascular


Rhythm: Regular


Heart sounds: Normal auscultation


Pulses: Normal: Radial





Course





- Re-evaluation


Re-evalutation: 





I have greeted and performed a rapid initial assessment of this patient.  A 

comprehensive ED assessment and evaluation of the patient, analysis of test 

results and completion of medical decision making process will be conducted by 

an additional ED providers.








- Vital Signs


Vital signs: 





                                        











Temp Pulse Resp BP Pulse Ox


 


 98.6 F   72   15   131/67 H  100 


 


 20 12:57  20 12:57  20 12:57  20 12:57  20 12:57














Doctor's Discharge





- Discharge


Referrals: 


SOULEYMANE WOODARD MD [Primary Care Provider] - Follow up as needed